# Patient Record
Sex: MALE | Race: WHITE | HISPANIC OR LATINO | URBAN - METROPOLITAN AREA
[De-identification: names, ages, dates, MRNs, and addresses within clinical notes are randomized per-mention and may not be internally consistent; named-entity substitution may affect disease eponyms.]

---

## 2018-09-16 ENCOUNTER — EMERGENCY (EMERGENCY)
Facility: HOSPITAL | Age: 78
LOS: 1 days | Discharge: HOME | End: 2018-09-16
Attending: EMERGENCY MEDICINE | Admitting: EMERGENCY MEDICINE
Payer: MEDICARE

## 2018-09-16 VITALS
SYSTOLIC BLOOD PRESSURE: 161 MMHG | RESPIRATION RATE: 18 BRPM | DIASTOLIC BLOOD PRESSURE: 75 MMHG | TEMPERATURE: 98 F | HEART RATE: 53 BPM | OXYGEN SATURATION: 97 %

## 2018-09-16 VITALS
HEART RATE: 74 BPM | RESPIRATION RATE: 18 BRPM | SYSTOLIC BLOOD PRESSURE: 170 MMHG | TEMPERATURE: 99 F | OXYGEN SATURATION: 96 % | DIASTOLIC BLOOD PRESSURE: 81 MMHG

## 2018-09-16 DIAGNOSIS — Z88.8 ALLERGY STATUS TO OTHER DRUGS, MEDICAMENTS AND BIOLOGICAL SUBSTANCES: ICD-10-CM

## 2018-09-16 DIAGNOSIS — Z79.899 OTHER LONG TERM (CURRENT) DRUG THERAPY: ICD-10-CM

## 2018-09-16 DIAGNOSIS — K64.8 OTHER HEMORRHOIDS: ICD-10-CM

## 2018-09-16 DIAGNOSIS — I10 ESSENTIAL (PRIMARY) HYPERTENSION: ICD-10-CM

## 2018-09-16 DIAGNOSIS — K62.5 HEMORRHAGE OF ANUS AND RECTUM: ICD-10-CM

## 2018-09-16 LAB
ALBUMIN SERPL ELPH-MCNC: 4.6 G/DL — SIGNIFICANT CHANGE UP (ref 3.3–5)
ALP SERPL-CCNC: 75 U/L — SIGNIFICANT CHANGE UP (ref 40–120)
ALT FLD-CCNC: 29 U/L — SIGNIFICANT CHANGE UP (ref 10–45)
ANION GAP SERPL CALC-SCNC: 17 MMOL/L — SIGNIFICANT CHANGE UP (ref 5–17)
AST SERPL-CCNC: 20 U/L — SIGNIFICANT CHANGE UP (ref 10–40)
BASOPHILS NFR BLD AUTO: 0.5 % — SIGNIFICANT CHANGE UP (ref 0–2)
BILIRUB SERPL-MCNC: 0.5 MG/DL — SIGNIFICANT CHANGE UP (ref 0.2–1.2)
BUN SERPL-MCNC: 23 MG/DL — SIGNIFICANT CHANGE UP (ref 7–23)
CALCIUM SERPL-MCNC: 9.4 MG/DL — SIGNIFICANT CHANGE UP (ref 8.4–10.5)
CHLORIDE SERPL-SCNC: 98 MMOL/L — SIGNIFICANT CHANGE UP (ref 96–108)
CO2 SERPL-SCNC: 23 MMOL/L — SIGNIFICANT CHANGE UP (ref 22–31)
CREAT SERPL-MCNC: 0.94 MG/DL — SIGNIFICANT CHANGE UP (ref 0.5–1.3)
EOSINOPHIL NFR BLD AUTO: 2.4 % — SIGNIFICANT CHANGE UP (ref 0–6)
GLUCOSE SERPL-MCNC: 113 MG/DL — HIGH (ref 70–99)
HCT VFR BLD CALC: 42.4 % — SIGNIFICANT CHANGE UP (ref 39–50)
HGB BLD-MCNC: 14.9 G/DL — SIGNIFICANT CHANGE UP (ref 13–17)
LYMPHOCYTES # BLD AUTO: 25 % — SIGNIFICANT CHANGE UP (ref 13–44)
MCHC RBC-ENTMCNC: 30.6 PG — SIGNIFICANT CHANGE UP (ref 27–34)
MCHC RBC-ENTMCNC: 35.1 G/DL — SIGNIFICANT CHANGE UP (ref 32–36)
MCV RBC AUTO: 87.1 FL — SIGNIFICANT CHANGE UP (ref 80–100)
MONOCYTES NFR BLD AUTO: 8.6 % — SIGNIFICANT CHANGE UP (ref 2–14)
NEUTROPHILS NFR BLD AUTO: 63.5 % — SIGNIFICANT CHANGE UP (ref 43–77)
PLATELET # BLD AUTO: 135 K/UL — LOW (ref 150–400)
POTASSIUM SERPL-MCNC: 3.6 MMOL/L — SIGNIFICANT CHANGE UP (ref 3.5–5.3)
POTASSIUM SERPL-SCNC: 3.6 MMOL/L — SIGNIFICANT CHANGE UP (ref 3.5–5.3)
PROT SERPL-MCNC: 8 G/DL — SIGNIFICANT CHANGE UP (ref 6–8.3)
RBC # BLD: 4.87 M/UL — SIGNIFICANT CHANGE UP (ref 4.2–5.8)
RBC # FLD: 13.1 % — SIGNIFICANT CHANGE UP (ref 10.3–16.9)
SODIUM SERPL-SCNC: 138 MMOL/L — SIGNIFICANT CHANGE UP (ref 135–145)
WBC # BLD: 8.8 K/UL — SIGNIFICANT CHANGE UP (ref 3.8–10.5)
WBC # FLD AUTO: 8.8 K/UL — SIGNIFICANT CHANGE UP (ref 3.8–10.5)

## 2018-09-16 PROCEDURE — 99284 EMERGENCY DEPT VISIT MOD MDM: CPT | Mod: 25

## 2018-09-16 PROCEDURE — 80053 COMPREHEN METABOLIC PANEL: CPT

## 2018-09-16 PROCEDURE — 99283 EMERGENCY DEPT VISIT LOW MDM: CPT

## 2018-09-16 PROCEDURE — 85025 COMPLETE CBC W/AUTO DIFF WBC: CPT

## 2018-09-16 PROCEDURE — 36415 COLL VENOUS BLD VENIPUNCTURE: CPT

## 2018-09-16 RX ORDER — DOCUSATE SODIUM 100 MG
1 CAPSULE ORAL
Qty: 20 | Refills: 0
Start: 2018-09-16 | End: 2018-09-25

## 2018-09-16 RX ORDER — HYDROCORTISONE 1 %
1 OINTMENT (GRAM) TOPICAL
Qty: 14 | Refills: 0
Start: 2018-09-16 | End: 2018-09-22

## 2018-09-16 NOTE — ED PROVIDER NOTE - OBJECTIVE STATEMENT
78 y/o m hx HTN presents c/o one week of hard stools, straining for bowel movement.  Pt stating yesterday, noticed small amount of blood when he wiped, today there was blood in toilet bowl.  Pt denies fever, chills, abd pain, black stools, all other ROS negative.

## 2018-09-16 NOTE — ED ADULT NURSE NOTE - NSIMPLEMENTINTERV_GEN_ALL_ED
Implemented All Universal Safety Interventions:  [place] to call system. Call bell, personal items and telephone within reach. Instruct patient to call for assistance. Room bathroom lighting operational. Non-slip footwear when patient is off stretcher. Physically safe environment: no spills, clutter or unnecessary equipment. Stretcher in lowest position, wheels locked, appropriate side rails in place.

## 2018-09-16 NOTE — ED PROVIDER NOTE - ATTENDING CONTRIBUTION TO CARE
Attending Statement: I have personally performed a face to face diagnostic evaluation on this patient. I have reviewed the ACP note and agree with the history, exam and plan of care, except as noted.     Attending Contribution to Care:  78 y/o m hx HTN presents with blood in toilet bowel after bowel movement; pt with straining with bowel movement this week.  Internal hemorrhoid on exam, labs wnl.  Will plan for anusol suppositories, stool softener, The patient is stable for DC. They were advised to call their PMD for prompt outpatient follow up. Return precautions were discussed. The patient was advised to return to the ER for any concerning or worsening symptoms.

## 2018-09-16 NOTE — ED PROVIDER NOTE - MEDICAL DECISION MAKING DETAILS
78 y/o m hx HTN presents with blood in toilet bowel after bowel movement; pt with straining with bowel movement this week.  Internal hemorrhoid on exam, labs wnl.  Will plan for anusol suppositories, stool softener, f/u pmd.

## 2019-09-16 ENCOUNTER — OUTPATIENT (OUTPATIENT)
Dept: OUTPATIENT SERVICES | Facility: HOSPITAL | Age: 79
LOS: 1 days | End: 2019-09-16
Payer: MEDICARE

## 2019-09-16 DIAGNOSIS — R07.9 CHEST PAIN, UNSPECIFIED: ICD-10-CM

## 2019-09-16 PROCEDURE — 93017 CV STRESS TEST TRACING ONLY: CPT

## 2019-09-17 ENCOUNTER — APPOINTMENT (OUTPATIENT)
Dept: MRI IMAGING | Facility: HOSPITAL | Age: 79
End: 2019-09-17

## 2019-12-27 ENCOUNTER — EMERGENCY (EMERGENCY)
Facility: HOSPITAL | Age: 79
LOS: 1 days | Discharge: HOME | End: 2019-12-27
Attending: EMERGENCY MEDICINE | Admitting: EMERGENCY MEDICINE
Payer: MEDICARE

## 2019-12-27 VITALS
HEIGHT: 70 IN | RESPIRATION RATE: 17 BRPM | HEART RATE: 67 BPM | DIASTOLIC BLOOD PRESSURE: 68 MMHG | TEMPERATURE: 100 F | WEIGHT: 164.91 LBS | SYSTOLIC BLOOD PRESSURE: 148 MMHG | OXYGEN SATURATION: 96 %

## 2019-12-27 VITALS
RESPIRATION RATE: 16 BRPM | SYSTOLIC BLOOD PRESSURE: 116 MMHG | HEART RATE: 61 BPM | OXYGEN SATURATION: 100 % | TEMPERATURE: 98 F | DIASTOLIC BLOOD PRESSURE: 78 MMHG

## 2019-12-27 DIAGNOSIS — J10.1 INFLUENZA DUE TO OTHER IDENTIFIED INFLUENZA VIRUS WITH OTHER RESPIRATORY MANIFESTATIONS: ICD-10-CM

## 2019-12-27 DIAGNOSIS — R50.9 FEVER, UNSPECIFIED: ICD-10-CM

## 2019-12-27 LAB
ALBUMIN SERPL ELPH-MCNC: 4.6 G/DL — SIGNIFICANT CHANGE UP (ref 3.3–5)
ALP SERPL-CCNC: 73 U/L — SIGNIFICANT CHANGE UP (ref 40–120)
ALT FLD-CCNC: 15 U/L — SIGNIFICANT CHANGE UP (ref 10–45)
ANION GAP SERPL CALC-SCNC: 13 MMOL/L — SIGNIFICANT CHANGE UP (ref 5–17)
AST SERPL-CCNC: 18 U/L — SIGNIFICANT CHANGE UP (ref 10–40)
BASOPHILS # BLD AUTO: 0.05 K/UL — SIGNIFICANT CHANGE UP (ref 0–0.2)
BASOPHILS NFR BLD AUTO: 0.7 % — SIGNIFICANT CHANGE UP (ref 0–2)
BILIRUB SERPL-MCNC: 0.5 MG/DL — SIGNIFICANT CHANGE UP (ref 0.2–1.2)
BUN SERPL-MCNC: 16 MG/DL — SIGNIFICANT CHANGE UP (ref 7–23)
CALCIUM SERPL-MCNC: 8.9 MG/DL — SIGNIFICANT CHANGE UP (ref 8.4–10.5)
CHLORIDE SERPL-SCNC: 98 MMOL/L — SIGNIFICANT CHANGE UP (ref 96–108)
CO2 SERPL-SCNC: 23 MMOL/L — SIGNIFICANT CHANGE UP (ref 22–31)
CREAT SERPL-MCNC: 0.94 MG/DL — SIGNIFICANT CHANGE UP (ref 0.5–1.3)
EOSINOPHIL # BLD AUTO: 0.02 K/UL — SIGNIFICANT CHANGE UP (ref 0–0.5)
EOSINOPHIL NFR BLD AUTO: 0.3 % — SIGNIFICANT CHANGE UP (ref 0–6)
FLU A RESULT: DETECTED
FLU A RESULT: DETECTED
FLUAV AG NPH QL: DETECTED
FLUBV AG NPH QL: SIGNIFICANT CHANGE UP
GLUCOSE SERPL-MCNC: 101 MG/DL — HIGH (ref 70–99)
HCT VFR BLD CALC: 42.3 % — SIGNIFICANT CHANGE UP (ref 39–50)
HGB BLD-MCNC: 14.8 G/DL — SIGNIFICANT CHANGE UP (ref 13–17)
IMM GRANULOCYTES NFR BLD AUTO: 0.3 % — SIGNIFICANT CHANGE UP (ref 0–1.5)
LACTATE SERPL-SCNC: 1.3 MMOL/L — SIGNIFICANT CHANGE UP (ref 0.5–2)
LYMPHOCYTES # BLD AUTO: 1.17 K/UL — SIGNIFICANT CHANGE UP (ref 1–3.3)
LYMPHOCYTES # BLD AUTO: 17.2 % — SIGNIFICANT CHANGE UP (ref 13–44)
MCHC RBC-ENTMCNC: 31.6 PG — SIGNIFICANT CHANGE UP (ref 27–34)
MCHC RBC-ENTMCNC: 35 GM/DL — SIGNIFICANT CHANGE UP (ref 32–36)
MCV RBC AUTO: 90.4 FL — SIGNIFICANT CHANGE UP (ref 80–100)
MONOCYTES # BLD AUTO: 0.8 K/UL — SIGNIFICANT CHANGE UP (ref 0–0.9)
MONOCYTES NFR BLD AUTO: 11.7 % — SIGNIFICANT CHANGE UP (ref 2–14)
NEUTROPHILS # BLD AUTO: 4.75 K/UL — SIGNIFICANT CHANGE UP (ref 1.8–7.4)
NEUTROPHILS NFR BLD AUTO: 69.8 % — SIGNIFICANT CHANGE UP (ref 43–77)
NRBC # BLD: 0 /100 WBCS — SIGNIFICANT CHANGE UP (ref 0–0)
PLATELET # BLD AUTO: 112 K/UL — LOW (ref 150–400)
POTASSIUM SERPL-MCNC: 3.5 MMOL/L — SIGNIFICANT CHANGE UP (ref 3.5–5.3)
POTASSIUM SERPL-SCNC: 3.5 MMOL/L — SIGNIFICANT CHANGE UP (ref 3.5–5.3)
PROT SERPL-MCNC: 8 G/DL — SIGNIFICANT CHANGE UP (ref 6–8.3)
RBC # BLD: 4.68 M/UL — SIGNIFICANT CHANGE UP (ref 4.2–5.8)
RBC # FLD: 13.1 % — SIGNIFICANT CHANGE UP (ref 10.3–14.5)
RSV RESULT: SIGNIFICANT CHANGE UP
RSV RNA RESP QL NAA+PROBE: SIGNIFICANT CHANGE UP
SODIUM SERPL-SCNC: 134 MMOL/L — LOW (ref 135–145)
WBC # BLD: 6.81 K/UL — SIGNIFICANT CHANGE UP (ref 3.8–10.5)
WBC # FLD AUTO: 6.81 K/UL — SIGNIFICANT CHANGE UP (ref 3.8–10.5)

## 2019-12-27 PROCEDURE — 71046 X-RAY EXAM CHEST 2 VIEWS: CPT | Mod: 26,59

## 2019-12-27 PROCEDURE — 83605 ASSAY OF LACTIC ACID: CPT

## 2019-12-27 PROCEDURE — 85025 COMPLETE CBC W/AUTO DIFF WBC: CPT

## 2019-12-27 PROCEDURE — 99284 EMERGENCY DEPT VISIT MOD MDM: CPT | Mod: 25

## 2019-12-27 PROCEDURE — 80053 COMPREHEN METABOLIC PANEL: CPT

## 2019-12-27 PROCEDURE — 87631 RESP VIRUS 3-5 TARGETS: CPT

## 2019-12-27 PROCEDURE — 99283 EMERGENCY DEPT VISIT LOW MDM: CPT | Mod: 25

## 2019-12-27 PROCEDURE — 87040 BLOOD CULTURE FOR BACTERIA: CPT

## 2019-12-27 PROCEDURE — 71046 X-RAY EXAM CHEST 2 VIEWS: CPT | Mod: 26

## 2019-12-27 PROCEDURE — 71046 X-RAY EXAM CHEST 2 VIEWS: CPT

## 2019-12-27 PROCEDURE — 36415 COLL VENOUS BLD VENIPUNCTURE: CPT

## 2019-12-27 RX ORDER — SODIUM CHLORIDE 9 MG/ML
1000 INJECTION INTRAMUSCULAR; INTRAVENOUS; SUBCUTANEOUS ONCE
Refills: 0 | Status: COMPLETED | OUTPATIENT
Start: 2019-12-27 | End: 2019-12-27

## 2019-12-27 RX ORDER — ACETAMINOPHEN 500 MG
650 TABLET ORAL ONCE
Refills: 0 | Status: COMPLETED | OUTPATIENT
Start: 2019-12-27 | End: 2019-12-27

## 2019-12-27 RX ADMIN — SODIUM CHLORIDE 1000 MILLILITER(S): 9 INJECTION INTRAMUSCULAR; INTRAVENOUS; SUBCUTANEOUS at 08:46

## 2019-12-27 RX ADMIN — Medication 650 MILLIGRAM(S): at 08:45

## 2019-12-27 RX ADMIN — Medication 75 MILLIGRAM(S): at 10:04

## 2019-12-27 NOTE — ED PROVIDER NOTE - CLINICAL SUMMARY MEDICAL DECISION MAKING FREE TEXT BOX
Flu like symptoms, + crackles L base.  CXR no consolidation.  Labs stable.  Unlikely superimposed pna at this time.  Plan dc with tamiflu and close outpt pcp fu.

## 2019-12-27 NOTE — ED PROVIDER NOTE - OBJECTIVE STATEMENT
77 yo M with fever, cough, congestion, body aches for several days.  Denies chest pain of sob.  No sick contacts or recent travel.

## 2019-12-27 NOTE — ED ADULT NURSE NOTE - NSIMPLEMENTINTERV_GEN_ALL_ED
Implemented All Universal Safety Interventions:  [place] to call system. Call bell, personal items and telephone within reach. Instruct patient to call for assistance. Room bathroom lighting operational. Non-slip footwear when patient is off stretcher. Physically safe environment: no spills, clutter or unnecessary equipment. Stretcher in lowest position, wheels locked, appropriate side rails in place.

## 2019-12-27 NOTE — ED ADULT NURSE NOTE - OBJECTIVE STATEMENT
Patient presents to the ED c/o fever, cough x Wednesday.    Patient also reports generalized weakness.  Has been taking Tylenol at home for fever.  On arrival AA&OX3, respirations unlabored, non-diaphoretic, no pallor.  Crackles to left lung base.

## 2019-12-27 NOTE — ED PROVIDER NOTE - NSFOLLOWUPINSTRUCTIONS_ED_ALL_ED_FT
Influenza    WHAT YOU NEED TO KNOW:    Influenza (the flu) is an infection caused by the influenza virus. The flu is easily spread when an infected person coughs, sneezes, or has close contact with others. You may be able to spread the flu to others for 1 week or longer after signs or symptoms appear.     DISCHARGE INSTRUCTIONS:    Call your local emergency number (911 in the ) if:     You have trouble breathing, and your lips look purple or blue.      You have a seizure.    Call your doctor if:     You are dizzy, or you are urinating less or not at all.       You have a headache with a stiff neck, and you feel tired or confused.      You have new pain or pressure in your chest.      Your symptoms, such as shortness of breath, vomiting, or diarrhea, get worse.       Your symptoms, such as fever and coughing, seem to get better, but then get worse.       You have new muscle pain or weakness.      You have questions or concerns about your condition or care.    Medicines: You may need any of the following:     Acetaminophen decreases pain and fever. It is available without a doctor's order. Ask how much to take and how often to take it. Follow directions. Read the labels of all other medicines you are using to see if they also contain acetaminophen, or ask your doctor or pharmacist. Acetaminophen can cause liver damage if not taken correctly. Do not use more than 4 grams (4,000 milligrams) total of acetaminophen in one day.       NSAIDs, such as ibuprofen, help decrease swelling, pain, and fever. This medicine is available with or without a doctor's order. NSAIDs can cause stomach bleeding or kidney problems in certain people. If you take blood thinner medicine, always ask your healthcare provider if NSAIDs are safe for you. Always read the medicine label and follow directions.      Antivirals help fight a viral infection.      Take your medicine as directed. Contact your healthcare provider if you think your medicine is not helping or if you have side effects. Tell him or her if you are allergic to any medicine. Keep a list of the medicines, vitamins, and herbs you take. Include the amounts, and when and why you take them. Bring the list or the pill bottles to follow-up visits. Carry your medicine list with you in case of an emergency.    Rest as much as you can to help you recover.    Drink liquids as directed to help prevent dehydration. Ask how much liquid to drink each day and which liquids are best for you.    Prevent the spread of influenza:     Wash your hands often. Use soap and water. Wash your hands after you use the bathroom, change a child's diapers, or sneeze. Wash your hands before you prepare or eat food. Use gel hand cleanser that has 60% alcohol, when soap and water are not available. Do not touch your eyes, nose, or mouth unless you have washed your hands first.Handwashing           Cover your mouth when you sneeze or cough. Cough into a tissue or the bend of your arm. If you use a tissue, throw it away immediately and wash your hands.       Clean shared items with a germ-killing . Clean table surfaces, doorknobs, and light switches. Do not share towels, silverware, and dishes with people who are sick. Wash bed sheets, towels, silverware, and dishes with soap and water.       Wear a mask over your mouth and nose if you are sick. The face mask may help protect others from becoming infected with the flu. Wear the mask when in common areas of your home or if you seek care with a healthcare provider.       Stay away from others if you are sick. Stay at home until 24 hours after your fever and symptoms are gone.      Influenza vaccine helps prevent influenza (flu). Everyone older than 6 months should get a yearly influenza vaccine. Get the vaccine as soon as it is available, usually in September or October each year.    Follow up with your healthcare provider as directed: Write down your questions so you remember to ask them during your visits.        © Copyright [employer] 2018 All illustrations and images included in CareNotes are the copyrighted property of [employer] or [employer]       Follow up with your PCP in 3-4 days.

## 2019-12-27 NOTE — ED PROVIDER NOTE - PATIENT PORTAL LINK FT
You can access the FollowMyHealth Patient Portal offered by [facility] by registering at the following website: [email or web address]. By joining [employer]’s FollowMyHealth portal, you will also be able to view your health information using other applications (apps) compatible with our system.

## 2020-01-01 LAB
CULTURE RESULTS: SIGNIFICANT CHANGE UP
CULTURE RESULTS: SIGNIFICANT CHANGE UP
SPECIMEN SOURCE: SIGNIFICANT CHANGE UP
SPECIMEN SOURCE: SIGNIFICANT CHANGE UP

## 2020-01-28 ENCOUNTER — EMERGENCY (EMERGENCY)
Facility: HOSPITAL | Age: 80
LOS: 1 days | Discharge: HOME | End: 2020-01-28
Attending: EMERGENCY MEDICINE | Admitting: EMERGENCY MEDICINE
Payer: COMMERCIAL

## 2020-01-28 VITALS
OXYGEN SATURATION: 97 % | HEIGHT: 66 IN | WEIGHT: 184.97 LBS | HEART RATE: 101 BPM | DIASTOLIC BLOOD PRESSURE: 84 MMHG | RESPIRATION RATE: 16 BRPM | SYSTOLIC BLOOD PRESSURE: 145 MMHG | TEMPERATURE: 98 F

## 2020-01-28 PROCEDURE — 85025 COMPLETE CBC W/AUTO DIFF WBC: CPT

## 2020-01-28 PROCEDURE — 99284 EMERGENCY DEPT VISIT MOD MDM: CPT

## 2020-01-28 PROCEDURE — 96374 THER/PROPH/DIAG INJ IV PUSH: CPT

## 2020-01-28 PROCEDURE — 99284 EMERGENCY DEPT VISIT MOD MDM: CPT | Mod: 25

## 2020-01-28 PROCEDURE — 83690 ASSAY OF LIPASE: CPT

## 2020-01-28 PROCEDURE — 36415 COLL VENOUS BLD VENIPUNCTURE: CPT

## 2020-01-28 PROCEDURE — 96375 TX/PRO/DX INJ NEW DRUG ADDON: CPT

## 2020-01-28 PROCEDURE — 80053 COMPREHEN METABOLIC PANEL: CPT

## 2020-01-28 RX ORDER — ONDANSETRON 8 MG/1
1 TABLET, FILM COATED ORAL
Qty: 9 | Refills: 0
Start: 2020-01-28 | End: 2020-01-30

## 2020-01-28 RX ORDER — ONDANSETRON 8 MG/1
4 TABLET, FILM COATED ORAL ONCE
Refills: 0 | Status: COMPLETED | OUTPATIENT
Start: 2020-01-28 | End: 2020-01-28

## 2020-01-28 RX ORDER — FAMOTIDINE 10 MG/ML
20 INJECTION INTRAVENOUS ONCE
Refills: 0 | Status: COMPLETED | OUTPATIENT
Start: 2020-01-28 | End: 2020-01-28

## 2020-01-28 RX ORDER — FAMOTIDINE 10 MG/ML
1 INJECTION INTRAVENOUS
Qty: 14 | Refills: 0
Start: 2020-01-28 | End: 2020-02-03

## 2020-01-28 RX ORDER — SODIUM CHLORIDE 9 MG/ML
1000 INJECTION INTRAMUSCULAR; INTRAVENOUS; SUBCUTANEOUS ONCE
Refills: 0 | Status: COMPLETED | OUTPATIENT
Start: 2020-01-28 | End: 2020-01-28

## 2020-01-28 RX ADMIN — FAMOTIDINE 20 MILLIGRAM(S): 10 INJECTION INTRAVENOUS at 06:05

## 2020-01-28 RX ADMIN — SODIUM CHLORIDE 1000 MILLILITER(S): 9 INJECTION INTRAMUSCULAR; INTRAVENOUS; SUBCUTANEOUS at 06:05

## 2020-01-28 RX ADMIN — ONDANSETRON 4 MILLIGRAM(S): 8 TABLET, FILM COATED ORAL at 06:05

## 2020-01-28 NOTE — ED PROVIDER NOTE - ATTENDING CONTRIBUTION TO CARE
78 y/o M, BIBA, c/o n/v/d s/p eating pork sausages and cake. Pt states that felt "uneasy" in stomach after eating, then got nauseous and vomited x4, non bloody, non bilious. Denies fevers, chills, cp, sob, abd pain, dizziness, syncope. Pt with PSH of appendectomy. Pt AAO, NAD, RRR, Abd: soft with mild epigastric abd TTP. Pt is well appearing non toxic, labs wnl, improved w/ivf and meds, tolerated po challenge, suspect viral gastroenteritis vs food bourne illness, supportive care - hydration , brat diet discussed at length, prn zofran and to take pepcid, f/u w/pmd pt understands and agrees w/plan, strict return precautions given.

## 2020-01-28 NOTE — ED PROVIDER NOTE - PATIENT PORTAL LINK FT
You can access the FollowMyHealth Patient Portal offered by [facility] by registering at the following website: [email or web address]. By joining [employer]’s FollowMyHealth portal, you will also be able to view your health information using other applications (apps) compatible with our system.

## 2020-01-28 NOTE — ED ADULT NURSE NOTE - OBJECTIVE STATEMENT
80 y/o male c/o nausea and vomiting. Pt reports vomiting multiple times this morning. Pt reports feeling a little better afterwards. pt denies urinary complaints. pt speaks clear, MAEx4, ambulates steady, unlabored breathing. Abd soft ntnd. Skin dry warm.

## 2020-01-28 NOTE — ED PROVIDER NOTE - NSFOLLOWUPINSTRUCTIONS_ED_ALL_ED_FT
[place] Diet  A bland diet consists of foods that are often soft and do not have a lot of fat, fiber, or extra seasonings. Foods without fat, fiber, or seasoning are easier for the body to digest. They are also less likely to irritate your mouth, throat, stomach, and other parts of your digestive system. A bland diet is sometimes called a BRAT diet.  What is my plan?  Your health care provider or food and nutrition specialist (dietitian) may recommend specific changes to your diet to prevent symptoms or to treat your symptoms. These changes may include:  Eating small meals often.Cooking food until it is soft enough to chew easily.Chewing your food well.Drinking fluids slowly.Not eating foods that are very spicy, sour, or fatty.Not eating citrus fruits, such as oranges and grapefruit.What do I need to know about this diet?  Eat a variety of foods from the bland diet food list.Do not follow a bland diet longer than needed.Ask your health care provider whether you should take vitamins or supplements.What foods can I eat?  Grains   Hot cereals, such as cream of wheat. Rice. Bread, crackers, or tortillas made from refined white flour.  Vegetables   Canned or cooked vegetables. Mashed or boiled potatoes.  Fruits   Bananas. Applesauce. Other types of cooked or canned fruit with the skin and seeds removed, such as canned peaches or pears.  Meats and other proteins   Scrambled eggs. Creamy peanut butter or other nut butters. Lean, well-cooked meats, such as chicken or fish. Tofu. Soups or broths.  Dairy  Low-fat dairy products, such as milk, cottage cheese, or yogurt.  Beverages   Water. Herbal tea. Apple juice.  Fats and oils   Mild salad dressings. Canola or olive oil.  Sweets and desserts   Pudding. Custard. Fruit gelatin. Ice cream.  The items listed above may not be a complete list of recommended foods and beverages. Contact a dietitian for more options.   What foods are not recommended?  Grains   Whole grain breads and cereals.  Vegetables   Raw vegetables.  Fruits   Raw fruits, especially citrus, berries, or dried fruits.  Dairy   Whole fat dairy foods.  Beverages   Caffeinated drinks. Alcohol.  Seasonings and condiments   Strongly flavored seasonings or condiments. Hot sauce. Salsa.  Other foods   Spicy foods. Fried foods. Sour foods, such as pickled or fermented foods. Foods with high sugar content. Foods high in fiber.  The items listed above may not be a complete list of foods and beverages to avoid. Contact a dietitian for more information.   Summary  A bland diet consists of foods that are often soft and do not have a lot of fat, fiber, or extra seasonings.Foods without fat, fiber, or seasoning are easier for the body to digest.Check with your health care provider to see how long you should follow this diet plan. It is not meant to be followed for long periods.

## 2020-01-28 NOTE — ED PROVIDER NOTE - CLINICAL SUMMARY MEDICAL DECISION MAKING FREE TEXT BOX
pt c/o n/v/d after eating dinner, non bloody non bilious, no fevers or abd pain, benign abd on exam, hx of appendectomy yrs ago, well appearing non toxic, labs wnl, improved w/ivf and meds, tolerated po challenge, suspect viral gastroenteritis vs food bourne illness, supportive care - hydration , brat diet discussed at length, prn zofran and to take pepcid, f/u w/pmd pt understands and agrees w/plan, strict return precautions given

## 2020-01-28 NOTE — ED PROVIDER NOTE - OBJECTIVE STATEMENT
The pt is a 80 y/o M, BIBA, c/o n/v/d s/p eating pork sausages and cake. Pt states that felt "uneasy" in stomach after eating, then got nauseous and vomited x4, non bloody, non bilious. Denies fevers, chills, cp, sob, abd pain, dizziness, syncope

## 2020-02-02 DIAGNOSIS — Z91.018 ALLERGY TO OTHER FOODS: ICD-10-CM

## 2020-02-02 DIAGNOSIS — Z88.0 ALLERGY STATUS TO PENICILLIN: ICD-10-CM

## 2020-02-02 DIAGNOSIS — K52.9 NONINFECTIVE GASTROENTERITIS AND COLITIS, UNSPECIFIED: ICD-10-CM

## 2020-02-02 DIAGNOSIS — I10 ESSENTIAL (PRIMARY) HYPERTENSION: ICD-10-CM

## 2020-02-02 DIAGNOSIS — Z79.899 OTHER LONG TERM (CURRENT) DRUG THERAPY: ICD-10-CM

## 2020-02-02 DIAGNOSIS — R11.2 NAUSEA WITH VOMITING, UNSPECIFIED: ICD-10-CM

## 2020-12-28 ENCOUNTER — INPATIENT (INPATIENT)
Facility: HOSPITAL | Age: 80
LOS: 2 days | Discharge: HOME | DRG: 63 | End: 2020-12-31
Attending: PSYCHIATRY & NEUROLOGY | Admitting: PSYCHIATRY & NEUROLOGY
Payer: MEDICARE

## 2020-12-28 VITALS
HEART RATE: 90 BPM | HEIGHT: 66 IN | OXYGEN SATURATION: 97 % | SYSTOLIC BLOOD PRESSURE: 158 MMHG | WEIGHT: 165.79 LBS | TEMPERATURE: 98 F | DIASTOLIC BLOOD PRESSURE: 78 MMHG | RESPIRATION RATE: 18 BRPM

## 2020-12-28 LAB
ALBUMIN SERPL ELPH-MCNC: 4.5 G/DL — SIGNIFICANT CHANGE UP (ref 3.3–5)
ALP SERPL-CCNC: 73 U/L — SIGNIFICANT CHANGE UP (ref 40–120)
ALT FLD-CCNC: 17 U/L — SIGNIFICANT CHANGE UP (ref 10–45)
ANION GAP SERPL CALC-SCNC: 12 MMOL/L — SIGNIFICANT CHANGE UP (ref 5–17)
APTT BLD: 29.6 SEC — SIGNIFICANT CHANGE UP (ref 27.5–35.5)
AST SERPL-CCNC: 19 U/L — SIGNIFICANT CHANGE UP (ref 10–40)
BASOPHILS # BLD AUTO: 0.05 K/UL — SIGNIFICANT CHANGE UP (ref 0–0.2)
BASOPHILS NFR BLD AUTO: 0.7 % — SIGNIFICANT CHANGE UP (ref 0–2)
BILIRUB SERPL-MCNC: 0.6 MG/DL — SIGNIFICANT CHANGE UP (ref 0.2–1.2)
BUN SERPL-MCNC: 25 MG/DL — HIGH (ref 7–23)
CALCIUM SERPL-MCNC: 9.3 MG/DL — SIGNIFICANT CHANGE UP (ref 8.4–10.5)
CHLORIDE SERPL-SCNC: 104 MMOL/L — SIGNIFICANT CHANGE UP (ref 96–108)
CO2 SERPL-SCNC: 23 MMOL/L — SIGNIFICANT CHANGE UP (ref 22–31)
CREAT SERPL-MCNC: 1.05 MG/DL — SIGNIFICANT CHANGE UP (ref 0.5–1.3)
EOSINOPHIL # BLD AUTO: 0.05 K/UL — SIGNIFICANT CHANGE UP (ref 0–0.5)
EOSINOPHIL NFR BLD AUTO: 0.7 % — SIGNIFICANT CHANGE UP (ref 0–6)
GLUCOSE SERPL-MCNC: 101 MG/DL — HIGH (ref 70–99)
HCT VFR BLD CALC: 41.1 % — SIGNIFICANT CHANGE UP (ref 39–50)
HGB BLD-MCNC: 14.3 G/DL — SIGNIFICANT CHANGE UP (ref 13–17)
IMM GRANULOCYTES NFR BLD AUTO: 0.3 % — SIGNIFICANT CHANGE UP (ref 0–1.5)
INR BLD: 1.29 — HIGH (ref 0.88–1.16)
LYMPHOCYTES # BLD AUTO: 1.01 K/UL — SIGNIFICANT CHANGE UP (ref 1–3.3)
LYMPHOCYTES # BLD AUTO: 14.1 % — SIGNIFICANT CHANGE UP (ref 13–44)
MCHC RBC-ENTMCNC: 30.1 PG — SIGNIFICANT CHANGE UP (ref 27–34)
MCHC RBC-ENTMCNC: 34.8 GM/DL — SIGNIFICANT CHANGE UP (ref 32–36)
MCV RBC AUTO: 86.5 FL — SIGNIFICANT CHANGE UP (ref 80–100)
MONOCYTES # BLD AUTO: 0.56 K/UL — SIGNIFICANT CHANGE UP (ref 0–0.9)
MONOCYTES NFR BLD AUTO: 7.8 % — SIGNIFICANT CHANGE UP (ref 2–14)
NEUTROPHILS # BLD AUTO: 5.45 K/UL — SIGNIFICANT CHANGE UP (ref 1.8–7.4)
NEUTROPHILS NFR BLD AUTO: 76.4 % — SIGNIFICANT CHANGE UP (ref 43–77)
NRBC # BLD: 0 /100 WBCS — SIGNIFICANT CHANGE UP (ref 0–0)
PLATELET # BLD AUTO: 158 K/UL — SIGNIFICANT CHANGE UP (ref 150–400)
POTASSIUM SERPL-MCNC: 3.8 MMOL/L — SIGNIFICANT CHANGE UP (ref 3.5–5.3)
POTASSIUM SERPL-SCNC: 3.8 MMOL/L — SIGNIFICANT CHANGE UP (ref 3.5–5.3)
PROT SERPL-MCNC: 8 G/DL — SIGNIFICANT CHANGE UP (ref 6–8.3)
PROTHROM AB SERPL-ACNC: 15.3 SEC — HIGH (ref 10.6–13.6)
RBC # BLD: 4.75 M/UL — SIGNIFICANT CHANGE UP (ref 4.2–5.8)
RBC # FLD: 12.6 % — SIGNIFICANT CHANGE UP (ref 10.3–14.5)
SARS-COV-2 RNA SPEC QL NAA+PROBE: SIGNIFICANT CHANGE UP
SODIUM SERPL-SCNC: 139 MMOL/L — SIGNIFICANT CHANGE UP (ref 135–145)
TROPONIN T SERPL-MCNC: <0.01 NG/ML — SIGNIFICANT CHANGE UP (ref 0–0.01)
WBC # BLD: 7.14 K/UL — SIGNIFICANT CHANGE UP (ref 3.8–10.5)
WBC # FLD AUTO: 7.14 K/UL — SIGNIFICANT CHANGE UP (ref 3.8–10.5)

## 2020-12-28 PROCEDURE — 99291 CRITICAL CARE FIRST HOUR: CPT

## 2020-12-28 PROCEDURE — 99223 1ST HOSP IP/OBS HIGH 75: CPT

## 2020-12-28 PROCEDURE — 0042T: CPT

## 2020-12-28 PROCEDURE — 70498 CT ANGIOGRAPHY NECK: CPT | Mod: 26

## 2020-12-28 PROCEDURE — 70496 CT ANGIOGRAPHY HEAD: CPT | Mod: 26

## 2020-12-28 RX ORDER — ALTEPLASE 100 MG
6.8 KIT INTRAVENOUS ONCE
Refills: 0 | Status: COMPLETED | OUTPATIENT
Start: 2020-12-28 | End: 2020-12-28

## 2020-12-28 RX ORDER — ALTEPLASE 100 MG
60.9 KIT INTRAVENOUS ONCE
Refills: 0 | Status: COMPLETED | OUTPATIENT
Start: 2020-12-28 | End: 2020-12-28

## 2020-12-28 RX ADMIN — ALTEPLASE 408 MILLIGRAM(S): KIT at 14:56

## 2020-12-28 RX ADMIN — ALTEPLASE 60.9 MILLIGRAM(S): KIT at 14:58

## 2020-12-28 NOTE — ED PROVIDER NOTE - OBJECTIVE STATEMENT
78 y/o M with PMHx of HTN HLD, norvasc, lipitor, takes multivitamins. No other rx meds, ? hx of vertigo in past, presents with sudden onset dizziness, described as room spinning that started at 12:30PM, 2H PTA when sitting on couch and watching TV. Patient said that it continued and he attempted to stand up and drink water but it was difficult balancing, felt like he was going to pass out. Able to drink some water but dizziness and imbalance continued so presents to the ED for further evaluation. Had couple episodes of this prior, last episode 3 days ago, with improvement. Went away on its own. 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 No pain, denies focal weakness, numbness, only symptom patient complaints of is dizziness.

## 2020-12-28 NOTE — ED PROVIDER NOTE - CRITICAL CARE PROVIDED
direct patient care (not related to procedure)/interpretation of diagnostic studies/documentation/consultation with other physicians/conducted a detailed discussion of DNR status/consult w/ pt's family directly relating to pts condition

## 2020-12-28 NOTE — ED PROVIDER NOTE - CLINICAL SUMMARY MEDICAL DECISION MAKING FREE TEXT BOX
80 y/o M with PMHx of HTN HLD, ? hx vertigo today with dizziness and lightheadedness when sitting on bed and waking up. Stroke code called and evaluated by stroke team, tPA to be administered to patient. Admit to neurology. 78 y/o M with PMHx of HTN HLD, presents today with sudden onset dizziness and ataxia that started acutely 2 hours PTA. VS noted. Stroke code called and pt evaluated by stroke team in ED. CT Head with no acute hemorrhage. Rest of CTs noted. Per stroke team tPA  administered to patient. Labs/ studies noted. Pt stable in ED post TPA. Admitted to stroke team/ ICU.

## 2020-12-28 NOTE — CONSULT NOTE ADULT - ASSESSMENT
78 y/o male with PMH HTN (on Norvasc 10 mg) and HLD (on Lipitor 10 mg) who self-presented to the ED for acute onset of dizziness since 12:20 PM. NIHSS 4. CTH showing no acute ICH or transcortical infarction. CTP showing no perfusion deficit. CTA showing no LVO or stenosis. Despite negative imaging, tPA administered as 14:56 for focal neurologic deficits of bilateral dysmetria, slurred speech, and left pronator drift. Due to delay in stroke code being called by ED, tPA was outside of door to tpA window of 30 minutes. Pt to be admitted to the MICU for further monitoring and stroke evaluation.     1) Admit to MICU for post tPA monitoring  - NO antiplatelet as post tPA protocol  - Please perform dysphagia screen now   - Once dysphagia screen passed, start Atorvastatin 80 once daily  - Keep BP <180/105  - During infusion, stroke neuro exams every 15 minutes.   - Check stroke neuro checks every 15 minutes for first hour after infusion is stopped; every 30 minutes for the next 6 hours; hourly until 24 hours from end of infusion.   - Repeat CT head with any acute change in mental status.   - Keep temp <100F and maintain glucose 140-180.   - Place order for "Call MD if HR >100 or <55 or SaO2 <95%"  - Minimize arterial and venous punctures  - Maintain strict bed rest for 24 hours with HOB <30 degrees.   - No antiplatelet, anticoagulation, and heparin sq until 24 hour CT head negative for bleed.     2) Labs: Obtain Hemoglobin A1c, Lipid profile set, and TSH    3) Other tests:  - Recommend repeat CT head noncon 24 hours post-TPA to rule out bleed - 12/29/2020 @ 14:56  - Recommend MRI brain without contrast tomorrow  - Recommend TTE w/bubble   - PT/OT order  - Stroke education    4) DVT ppx - SCDs (NO heparin SQ as post tPA protocol)    Raissa Santamaria PA-C

## 2020-12-28 NOTE — ED ADULT NURSE NOTE - OBJECTIVE STATEMENT
presents to ED c/o dizziness since 1220, sudden onset. AAOx3, MAEx4. No other defecits. Hx of HTN and HLD. States he had difficulty ambulating due to being off balance. Also c/o light handedness when lying flat.

## 2020-12-28 NOTE — ED ADULT NURSE NOTE - NSIMPLEMENTINTERV_GEN_ALL_ED
Implemented All Universal Safety Interventions:  [place] to call system. Call bell, personal items and telephone within reach. Instruct patient to call for assistance. Room bathroom lighting operational. Non-slip footwear when patient is off stretcher. Physically safe environment: no spills, clutter or unnecessary equipment. Stretcher in lowest position, wheels locked, appropriate side rails in place.

## 2020-12-29 DIAGNOSIS — I10 ESSENTIAL (PRIMARY) HYPERTENSION: ICD-10-CM

## 2020-12-29 DIAGNOSIS — R63.8 OTHER SYMPTOMS AND SIGNS CONCERNING FOOD AND FLUID INTAKE: ICD-10-CM

## 2020-12-29 DIAGNOSIS — E78.5 HYPERLIPIDEMIA, UNSPECIFIED: ICD-10-CM

## 2020-12-29 LAB
A1C WITH ESTIMATED AVERAGE GLUCOSE RESULT: 5.7 % — HIGH (ref 4–5.6)
ANION GAP SERPL CALC-SCNC: 12 MMOL/L — SIGNIFICANT CHANGE UP (ref 5–17)
BUN SERPL-MCNC: 19 MG/DL — SIGNIFICANT CHANGE UP (ref 7–23)
CALCIUM SERPL-MCNC: 8.9 MG/DL — SIGNIFICANT CHANGE UP (ref 8.4–10.5)
CHLORIDE SERPL-SCNC: 105 MMOL/L — SIGNIFICANT CHANGE UP (ref 96–108)
CHOLEST SERPL-MCNC: 98 MG/DL — SIGNIFICANT CHANGE UP
CO2 SERPL-SCNC: 23 MMOL/L — SIGNIFICANT CHANGE UP (ref 22–31)
CREAT SERPL-MCNC: 0.72 MG/DL — SIGNIFICANT CHANGE UP (ref 0.5–1.3)
ESTIMATED AVERAGE GLUCOSE: 117 MG/DL — HIGH (ref 68–114)
GLUCOSE SERPL-MCNC: 86 MG/DL — SIGNIFICANT CHANGE UP (ref 70–99)
HCT VFR BLD CALC: 40.4 % — SIGNIFICANT CHANGE UP (ref 39–50)
HDLC SERPL-MCNC: 32 MG/DL — LOW
HGB BLD-MCNC: 13.9 G/DL — SIGNIFICANT CHANGE UP (ref 13–17)
LIPID PNL WITH DIRECT LDL SERPL: 53 MG/DL — SIGNIFICANT CHANGE UP
MCHC RBC-ENTMCNC: 30 PG — SIGNIFICANT CHANGE UP (ref 27–34)
MCHC RBC-ENTMCNC: 34.4 GM/DL — SIGNIFICANT CHANGE UP (ref 32–36)
MCV RBC AUTO: 87.1 FL — SIGNIFICANT CHANGE UP (ref 80–100)
NON HDL CHOLESTEROL: 66 MG/DL — SIGNIFICANT CHANGE UP
NRBC # BLD: 0 /100 WBCS — SIGNIFICANT CHANGE UP (ref 0–0)
PLATELET # BLD AUTO: 141 K/UL — LOW (ref 150–400)
POTASSIUM SERPL-MCNC: 3.6 MMOL/L — SIGNIFICANT CHANGE UP (ref 3.5–5.3)
POTASSIUM SERPL-SCNC: 3.6 MMOL/L — SIGNIFICANT CHANGE UP (ref 3.5–5.3)
RBC # BLD: 4.64 M/UL — SIGNIFICANT CHANGE UP (ref 4.2–5.8)
RBC # FLD: 12.7 % — SIGNIFICANT CHANGE UP (ref 10.3–14.5)
SODIUM SERPL-SCNC: 140 MMOL/L — SIGNIFICANT CHANGE UP (ref 135–145)
TRIGL SERPL-MCNC: 66 MG/DL — SIGNIFICANT CHANGE UP
TSH SERPL-MCNC: 2.18 UIU/ML — SIGNIFICANT CHANGE UP (ref 0.35–4.94)
WBC # BLD: 7.16 K/UL — SIGNIFICANT CHANGE UP (ref 3.8–10.5)
WBC # FLD AUTO: 7.16 K/UL — SIGNIFICANT CHANGE UP (ref 3.8–10.5)

## 2020-12-29 PROCEDURE — 70450 CT HEAD/BRAIN W/O DYE: CPT | Mod: 26

## 2020-12-29 PROCEDURE — 93306 TTE W/DOPPLER COMPLETE: CPT | Mod: 26

## 2020-12-29 PROCEDURE — 99233 SBSQ HOSP IP/OBS HIGH 50: CPT

## 2020-12-29 RX ORDER — CHOLECALCIFEROL (VITAMIN D3) 125 MCG
2000 CAPSULE ORAL ONCE
Refills: 0 | Status: COMPLETED | OUTPATIENT
Start: 2020-12-29 | End: 2020-12-29

## 2020-12-29 RX ORDER — AMLODIPINE BESYLATE 2.5 MG/1
10 TABLET ORAL EVERY 24 HOURS
Refills: 0 | Status: DISCONTINUED | OUTPATIENT
Start: 2020-12-29 | End: 2020-12-31

## 2020-12-29 RX ORDER — ATORVASTATIN CALCIUM 80 MG/1
10 TABLET, FILM COATED ORAL AT BEDTIME
Refills: 0 | Status: DISCONTINUED | OUTPATIENT
Start: 2020-12-29 | End: 2020-12-30

## 2020-12-29 RX ADMIN — ATORVASTATIN CALCIUM 10 MILLIGRAM(S): 80 TABLET, FILM COATED ORAL at 22:21

## 2020-12-29 RX ADMIN — Medication 2000 UNIT(S): at 22:21

## 2020-12-29 RX ADMIN — Medication 1 TABLET(S): at 22:23

## 2020-12-29 NOTE — PROGRESS NOTE ADULT - ASSESSMENT
80 y/o male with PMH HTN (on Norvasc 10 mg) and HLD (on Lipitor 10 mg) who self-presented to the ED for acute onset of dizziness since 12:20 PM. NIHSS 4. CTH showing no acute ICH or transcortical infarction. CTP showing no perfusion deficit. CTA showing no LVO or stenosis. Despite negative imaging, tPA administered as 14:56 for focal neurologic deficits of bilateral dysmetria, slurred speech, and left pronator drift. Pt remains in MICU for post-tpa monitoring. Improved neurologic exam with resolution of bilateral dysmetria.      ****************INCOMPLETE************      - NO antiplatelet as post tPA protocol  - Please perform dysphagia screen now   - Once dysphagia screen passed, start Atorvastatin 80 once daily  - Keep BP <180/105  - During infusion, stroke neuro exams every 15 minutes.   - Check stroke neuro checks every 15 minutes for first hour after infusion is stopped; every 30 minutes for the next 6 hours; hourly until 24 hours from end of infusion.   - Repeat CT head with any acute change in mental status.   - Keep temp <100F and maintain glucose 140-180.   - Place order for "Call MD if HR >100 or <55 or SaO2 <95%"  - Minimize arterial and venous punctures  - Maintain strict bed rest for 24 hours with HOB <30 degrees.   - No antiplatelet, anticoagulation, and heparin sq until 24 hour CT head negative for bleed.       - Recommend repeat CT head noncon 24 hours post-TPA to rule out bleed - 12/29/2020 @ 14:56  - Recommend MRI brain without contrast tomorrow  - Recommend TTE w/bubble   - PT/OT order    Raissa Santamaria PA-C    4) DVT ppx - SCDs (NO heparin SQ as post tPA protocol)    Raissa Santamaria PA-C 78 y/o male with PMH HTN (on Norvasc 10 mg) and HLD (on Lipitor 10 mg) who self-presented to the ED for acute onset of dizziness since 12:20 PM. NIHSS 4. CTH showing no acute ICH or transcortical infarction. CTP showing no perfusion deficit. CTA showing no LVO or stenosis. Despite negative imaging, tPA administered as 14:56 for focal neurologic deficits of bilateral dysmetria, slurred speech, and left pronator drift. Pt remains in MICU for post-tpa monitoring. Pt is neurologically stable, with improving bilateral upper extremity ataxia  from admission.     1) Post-tPA protocol  - NO antiplatelet as post tPA protocol  - Please perform dysphagia screen now   - Once dysphagia screen passed, start Atorvastatin 80 once daily  - Keep BP <180/105  - During infusion, stroke neuro exams every 15 minutes.   - Check stroke neuro checks every 15 minutes for first hour after infusion is stopped; every 30 minutes for the next 6 hours; hourly until 24 hours from end of infusion.   - Repeat CT head with any acute change in mental status.   - Keep temp <100F and maintain glucose 140-180.   - Place order for "Call MD if HR >100 or <55 or SaO2 <95%"  - Minimize arterial and venous punctures  - Maintain strict bed rest for 24 hours with HOB <30 degrees.   - No antiplatelet, anticoagulation, and heparin sq until 24 hour CT head negative for bleed.     2) Recommendations   - Repeat CT head noncon 24 hours post-TPA to rule out bleed - 12/29/2020 @ 14:56  - MRI brain without contrast - please give Ativan prior to imaging for patient anxiety  - TTE w/bubble   - PT/OT order  - SCDs for DVT ppx       Raissa Santamaria PA-C

## 2020-12-29 NOTE — PROGRESS NOTE ADULT - PROBLEM SELECTOR PLAN 2
Patient with history of hypertension but now requiring a SBP less than 180 and DBP less than 105 in setting of potential CVA.   -Started Amlodipine for morning of 12/30

## 2020-12-29 NOTE — H&P ADULT - ASSESSMENT
78 y/o male with PMH HTN (on Norvasc 10 mg) and HLD (on Lipitor 10 mg) who self-presented to the ED for acute onset of dizziness since 12:20 PM. NIHSS 4. CTH showing no acute ICH or transcortical infarction. CTP showing no perfusion deficit. CTA showing no LVO or stenosis. Despite negative imaging, tPA administered as 14:56 for focal neurologic deficits of bilateral dysmetria, slurred speech, and left pronator drift. Due to delay in stroke code being called by ED, tPA was outside of door to tpA window of 30 minutes. Pt to be admitted to the MICU for further monitoring and stroke evaluation.     Neuro  #R/o CVA  Presented with dizziness and hand tremors b/l. NIHSS 4. CTH showing no acute ICH or transcortical infarction. CTP showing no perfusion deficit. CTA showing no LVO or stenosis. Despite negative imaging. S/p tPA. Exam at bedside only significant for left sided facial droop.    -See management below    #Post-tPA Monitoring    NO antiplatelet as post tPA protocol  - Failed dysphagia screen in ED. NPO for now. Will repeat in AM   - Once dysphagia screen passed, start Atorvastatin 80 once daily  - Keep BP <180/105  - During infusion, stroke neuro exams every 15 minutes.   - Check stroke neuro checks every 15 minutes for first hour after infusion is stopped; every 30 minutes for the next 6 hours; hourly until 24 hours from end of infusion.   - Repeat CT head with any acute change in mental status.   - Keep temp <100F and maintain glucose 140-180.   - Place order for "Call MD if HR >100 or <55 or SaO2 <95%"  - Minimize arterial and venous punctures  - Maintain strict bed rest for 24 hours with HOB <30 degrees.   - No antiplatelet, anticoagulation, and heparin sq until 24 hour CT head negative for bleed.   - Obtain Hemoglobin A1c, Lipid profile set, and TSH  -  Repeat CT head noncon 24 hours post-TPA to rule out bleed - 12/29/2020 @ 14:56  -  MRI brain without contrast tomorrow  - TTE w/bubble   - PT/OT order  - Stroke education  - DVT ppx - SCDs (NO heparin SQ as post tPA protocol)    CV  #HTN   On Amlodipine 10mg.   -Will hold for now in setting of permissive HTN   -BP goal <180/105    #HLD   On Lipitor 10mg   -Will start Lipitor 80mg once pt passes bedside dysphagia     Pulm   No active issues     GI  No active issues     Renal  No active issues     Endo   No active issues     Heme   No active issues     F: none  E: replete K <4, Mg <2  N: NPO until passes bedside dysphagia   GI Ppx: None   DVT Ppx: SCDs, s/p tPA   Code status: FULL   Dispo: MICU

## 2020-12-29 NOTE — PROGRESS NOTE ADULT - SUBJECTIVE AND OBJECTIVE BOX
OVERNIGHT EVENTS: Bradycardic to 42-45 when asleep, asymptomatic. EKG ordered. BP at goal. Passed bedside dysphagia.     SUBJECTIVE / INTERVAL HPI: Patient seen and examined at bedside. AAOx3. Reports continued dizziness though significant improvement since yesterday.    VITAL SIGNS:  Vital Signs Last 24 Hrs  T(C): 36.7 (29 Dec 2020 14:00), Max: 37.2 (28 Dec 2020 17:58)  T(F): 98 (29 Dec 2020 14:00), Max: 98.9 (28 Dec 2020 17:58)  HR: 64 (29 Dec 2020 15:00) (44 - 88)  BP: 172/72 (29 Dec 2020 15:00) (126/59 - 175/78)  BP(mean): 104 (29 Dec 2020 15:00) (85 - 112)  RR: 22 (29 Dec 2020 15:00) (14 - 38)  SpO2: 97% (29 Dec 2020 15:00) (94% - 98%)    12-28-20 @ 07:01  -  12-29-20 @ 07:00  --------------------------------------------------------  IN: 0 mL / OUT: 875 mL / NET: -875 mL    12-29-20 @ 07:01  -  12-29-20 @ 15:58  --------------------------------------------------------  IN: 0 mL / OUT: 100 mL / NET: -100 mL        PHYSICAL EXAM:    General: WDWN  HEENT: NC/AT; PERRL, anicteric sclera; MMM  Neck: supple  Cardiovascular: +S1/S2; RRR  Respiratory: CTA B/L; no W/R/R  Gastrointestinal: soft, NT/ND; +BSx4  Extremities: WWP; no edema, clubbing or cyanosis  Vascular: 2+ radial, DP/PT pulses B/L  Neurological: AAOx3; no focal deficits    MEDICATIONS:  MEDICATIONS  (STANDING):    MEDICATIONS  (PRN):      ALLERGIES:  Allergies    avocado (Anaphylaxis (Mild); Angioedema (Mod to Severe); Headache; Other)  penicillin (Rash)    Intolerances    statins (Muscle Pain)      LABS:                        13.9   7.16  )-----------( 141      ( 29 Dec 2020 05:07 )             40.4     12-29    140  |  105  |  19  ----------------------------<  86  3.6   |  23  |  0.72    Ca    8.9      29 Dec 2020 05:07    TPro  8.0  /  Alb  4.5  /  TBili  0.6  /  DBili  x   /  AST  19  /  ALT  17  /  AlkPhos  73  12-28    PT/INR - ( 28 Dec 2020 14:41 )   PT: 15.3 sec;   INR: 1.29          PTT - ( 28 Dec 2020 14:41 )  PTT:29.6 sec    CAPILLARY BLOOD GLUCOSE  109 (28 Dec 2020 17:37)      POCT Blood Glucose.: 109 mg/dL (28 Dec 2020 13:51)        RADIOLOGY & ADDITIONAL TESTS: Reviewed.   TRANSFER NOTE: MICU TO [address] [place] [address] COURSE :  80 y/o male with PMH HTN (on Norvasc 10 mg) and HLD (on Lipitor 10 mg) who presented to the ED on 12/28 for acute onset of dizziness since 12:20 PM. NIHSS 4. CTH showing no acute ICH or transcortical infarction. CTP showing no perfusion deficit. CTA showing no LVO or stenosis. Despite negative imaging, tPA administered as 14:56 for focal neurologic deficits of bilateral dysmetria, slurred speech, and left pronator drift. Pt admitted to MICU for post-tpa monitoring. In MICU patient monitored with frequent neuro checks. Passed bedside dysphagia screen. Started on Lipitor 80 mg. Pt is neurologically stable, with improving bilateral upper extremity ataxia from admission. Repeat CTH stable 24 hr post tpa. Patient is now medically stable for stepdown to [facility] for further management.     OVERNIGHT EVENTS: Bradycardic to 42-45 when asleep, asymptomatic. EKG ordered. BP at goal. Passed bedside dysphagia.     SUBJECTIVE / INTERVAL HPI: Patient seen and examined at bedside. AAOx3. Reports continued dizziness though significant improvement since yesterday. Reports dizziness is intermittent room-spinning sensation, worsened with head turning. Of note, patient reports he is unable to lie flat dt dizziness. States that his dizziness is 2/2 to a hernia in his neck. On ROS he denies HA, F/C, SOB, CP, palpitations, N/V, abdominal pain, diarrhea, constipation, dysuria, LE swelling. ROS otherwise negative.     VITAL SIGNS:  Vital Signs Last 24 Hrs  T(C): 36.7 (29 Dec 2020 14:00), Max: 37.2 (28 Dec 2020 17:58)  T(F): 98 (29 Dec 2020 14:00), Max: 98.9 (28 Dec 2020 17:58)  HR: 64 (29 Dec 2020 15:00) (44 - 88)  BP: 172/72 (29 Dec 2020 15:00) (126/59 - 175/78)  BP(mean): 104 (29 Dec 2020 15:00) (85 - 112)  RR: 22 (29 Dec 2020 15:00) (14 - 38)  SpO2: 97% (29 Dec 2020 15:00) (94% - 98%)    12-28-20 @ 07:01  -  12-29-20 @ 07:00  --------------------------------------------------------  IN: 0 mL / OUT: 875 mL / NET: -875 mL    12-29-20 @ 07:01  -  12-29-20 @ 15:58  --------------------------------------------------------  IN: 0 mL / OUT: 100 mL / NET: -100 mL        PHYSICAL EXAM:    General: WDWN. Sitting upright in bed in NAD  HEENT: NC/AT; PERRL, anicteric sclera; MMM, slight left facial droop  Neck: supple  Cardiovascular: +S1/S2; RRR  Respiratory: CTA B/L; no W/R/R  Gastrointestinal: soft, NT/ND; +BSx4  Extremities: WWP; no edema, clubbing or cyanosis  Vascular: 2+ radial, DP/PT pulses B/L  Neurological: AAOx3; CN 2-12 intact. Strength 5/5 in b/l UE and LE. Sensation intact. +dysmetria on finger to nose b/l    MEDICATIONS:  MEDICATIONS  (STANDING):    MEDICATIONS  (PRN):      ALLERGIES:  Allergies    avocado (Anaphylaxis (Mild); Angioedema (Mod to Severe); Headache; Other)  penicillin (Rash)    Intolerances    statins (Muscle Pain)      LABS:                        13.9   7.16  )-----------( 141      ( 29 Dec 2020 05:07 )             40.4     12-29    140  |  105  |  19  ----------------------------<  86  3.6   |  23  |  0.72    Ca    8.9      29 Dec 2020 05:07    TPro  8.0  /  Alb  4.5  /  TBili  0.6  /  DBili  x   /  AST  19  /  ALT  17  /  AlkPhos  73  12-28    PT/INR - ( 28 Dec 2020 14:41 )   PT: 15.3 sec;   INR: 1.29          PTT - ( 28 Dec 2020 14:41 )  PTT:29.6 sec    CAPILLARY BLOOD GLUCOSE  109 (28 Dec 2020 17:37)      POCT Blood Glucose.: 109 mg/dL (28 Dec 2020 13:51)        RADIOLOGY & ADDITIONAL TESTS: Reviewed.

## 2020-12-29 NOTE — PROGRESS NOTE ADULT - SUBJECTIVE AND OBJECTIVE BOX
Neurology Stroke Progress Note    INTERVAL HPI/OVERNIGHT EVENTS: Overnight, patient noted to be bradycardic with heart rate of 42-45 bpm when asleep. EKG ordered and noted to be stable. Patient seen and examined this morning. Pt reports feeling much better. States he still experiences intermittent dizziness/room-spinning sensation that is worse with head turning. Of note, patient reports he cannot lie flat because he "passes out" and states this has been going on for 25 years. As per patient, he believes this happens because he has a hernia in his neck. Pt states he normally sleeps upright when at home. Pt otherwise has no complaints today. Denies headache, tinnitus, chest pain, abdominal pain, nausea, vomiting, extremity numbness, extremity weakness.     Allergies  avocado (Anaphylaxis (Mild); Angioedema (Mod to Severe); Headache; Other)  penicillin (Rash)  Intolerances  statins (Muscle Pain)    ROS: As per HPI, otherwise negative    Vital Signs Last 24 Hrs  T(C): 36.2 (29 Dec 2020 09:01), Max: 37.2 (28 Dec 2020 17:58)  T(F): 97.2 (29 Dec 2020 09:01), Max: 98.9 (28 Dec 2020 17:58)  HR: 53 (29 Dec 2020 09:00) (44 - 90)  BP: 145/67 (29 Dec 2020 09:00) (124/58 - 175/78)  BP(mean): 96 (29 Dec 2020 09:00) (85 - 112)  RR: 30 (29 Dec 2020 09:00) (14 - 38)  SpO2: 96% (29 Dec 2020 09:00) (94% - 98%)    Physical exam:  General: awake and alert, sitting comfortably, no acute distress  Neurologic:  Mental status: awake, alert, oriented to person, place, and time. Speech is fluent, able to name objects. Follows commands. Attention/concentration intact. No dysarthria, no aphasia.  Cranial nerves:   II: visual fields are full to confrontation. pupils equally round and reactive to light,   III, IV, VI: EOMI without nystagmus  V:  V1-V3 sensation intact  VII: slight left facial asymmetry  VIII: gross hearing is intact  Motor: Normal bulk and tone, strength 5/5 in b/l UE and LE,  strength 5/5. No pronator drift  Sensation: intact to light touch. No neglect.  Coordination: No dysmetria on finger to nose bilaterally  Reflexes: downgoing toes bilaterally      LABS:                        13.9   7.16  )-----------( 141      ( 29 Dec 2020 05:07 )             40.4     12-29    140  |  105  |  19  ----------------------------<  86  3.6   |  23  |  0.72    Ca    8.9      29 Dec 2020 05:07    TPro  8.0  /  Alb  4.5  /  TBili  0.6  /  DBili  x   /  AST  19  /  ALT  17  /  AlkPhos  73  12-28    PT/INR - ( 28 Dec 2020 14:41 )   PT: 15.3 sec;   INR: 1.29        PTT - ( 28 Dec 2020 14:41 )  PTT:29.6 sec   Neurology Stroke Progress Note    INTERVAL HPI/OVERNIGHT EVENTS: Overnight, patient noted to be bradycardic with heart rate of 42-45 bpm when asleep. EKG ordered and noted to be stable. Patient seen and examined this morning. Pt reports feeling much better. States he still experiences intermittent dizziness/room-spinning sensation that is worse with head turning. Of note, patient reports he cannot lie flat because he "passes out" and states this has been going on for 25 years. As per patient, he believes this happens because he has a hernia in his neck. Pt states he normally sleeps upright when at home. Pt otherwise has no complaints today. Denies headache, tinnitus, chest pain, abdominal pain, nausea, vomiting, extremity numbness, extremity weakness.     Allergies  avocado (Anaphylaxis (Mild); Angioedema (Mod to Severe); Headache; Other)  penicillin (Rash)  Intolerances  statins (Muscle Pain)    ROS: As per HPI, otherwise negative    Vital Signs Last 24 Hrs  T(C): 36.2 (29 Dec 2020 09:01), Max: 37.2 (28 Dec 2020 17:58)  T(F): 97.2 (29 Dec 2020 09:01), Max: 98.9 (28 Dec 2020 17:58)  HR: 53 (29 Dec 2020 09:00) (44 - 90)  BP: 145/67 (29 Dec 2020 09:00) (124/58 - 175/78)  BP(mean): 96 (29 Dec 2020 09:00) (85 - 112)  RR: 30 (29 Dec 2020 09:00) (14 - 38)  SpO2: 96% (29 Dec 2020 09:00) (94% - 98%)    Physical exam:  General: awake and alert, sitting comfortably, no acute distress  Neurologic:  Mental status: awake, alert, oriented to person, place, and time. Speech is fluent, able to name objects. Follows commands. Attention/concentration intact. No dysarthria, no aphasia.  Cranial nerves:   II: visual fields are full to confrontation. pupils equally round and reactive to light,   III, IV, VI: EOMI without nystagmus  V:  V1-V3 sensation intact  VII: slight left facial asymmetry  VIII: gross hearing is intact  Motor: Normal bulk and tone, strength 5/5 in b/l UE and LE,  strength 5/5. No pronator drift  Sensation: intact to light touch. No neglect.  Coordination: Dysmetria on finger to nose bilaterally, right upper extremity more ataxic, but overall dysmetria improved from admission  Reflexes: downgoing toes bilaterally      LABS:                        13.9   7.16  )-----------( 141      ( 29 Dec 2020 05:07 )             40.4     12-29    140  |  105  |  19  ----------------------------<  86  3.6   |  23  |  0.72    Ca    8.9      29 Dec 2020 05:07    TPro  8.0  /  Alb  4.5  /  TBili  0.6  /  DBili  x   /  AST  19  /  ALT  17  /  AlkPhos  73  12-28    PT/INR - ( 28 Dec 2020 14:41 )   PT: 15.3 sec;   INR: 1.29        PTT - ( 28 Dec 2020 14:41 )  PTT:29.6 sec

## 2020-12-29 NOTE — H&P ADULT - NSHPPHYSICALEXAM_GEN_ALL_CORE
Physical exam:  General: No acute distress, awake and alert  Cardiovascular: Regular rate and rhythm, no murmurs, rubs, or gallops. No bruits  Pulmonary: Anterior breath sounds clear bilaterally, no crackles or wheezing. No use of accessory muscles  Extremities: Radial and DP pulses +2, no edema    Neurologic:  -Mental status: Awake, alert, oriented to person, place, and time. Speech is fluent with intact naming, repetition, and comprehension, no dysarthria. Recent and remote memory intact. Follows commands. Attention/concentration intact. Fund of knowledge appropriate.  -Cranial nerves:   II: Visual fields are full to confrontation.  III, IV, VI: Extraocular movements are intact without nystagmus. Pupils equally round and reactive to light  V:  Facial sensation V1-V3 equal and intact   VII: left sided facial droop   VIII: Hearing is bilaterally intact to finger rub  IX, X: Uvula is midline and soft palate rises symmetrically  XI: Head turning and shoulder shrug are intact.  XII: Tongue protrudes midline  Motor: Normal bulk and tone. No pronator drift. Strength bilateral upper extremity 5/5, bilateral lower extremities 5/5.  Sensation: Intact to light touch bilaterally. No neglect or extinction on double simultaneous testing.  Coordination: b/l dysmetria

## 2020-12-29 NOTE — PROGRESS NOTE ADULT - SUBJECTIVE AND OBJECTIVE BOX
Transfer Acceptance Note From MICU to [facility]: 78 y/o male with PMH HTN (on Norvasc 10 mg) and HLD (on Lipitor 10 mg) who presented to the ED on 12/28 for acute onset of dizziness since 12:20 PM. NIHSS 4. CTH showing no acute ICH or transcortical infarction. CTP showing no perfusion deficit. CTA showing no LVO or stenosis. Despite negative imaging, tPA administered as 14:56 for focal neurologic deficits of bilateral dysmetria, slurred speech, and left pronator drift. Pt admitted to MICU for post-tpa monitoring. In MICU patient monitored with frequent neuro checks. Passed bedside dysphagia screen. Started on Lipitor 80 mg. Pt is neurologically stable, with improving bilateral upper extremity ataxia from admission. Repeat CTH stable 24 hr post tpa. Patient is now medically stable for stepdown to [facility] for further management.     O/N Events:  Bradycardic to 42-45 when asleep, asymptomatic. EKG ordered. BP at goal. Passed bedside dysphagia.     Subjective/ROS:  Patient seen and examined at the bedside.  He appears disgruntled and complaining that he will be unable to sit for MRI tomorrow as he is claustrophobic.  However he denies HA, CP, SOB, n/v, changes in bowel/urinary habits.  12pt ROS otherwise negative.    VITALS  Vital Signs Last 24 Hrs  T(C): 36.9 (29 Dec 2020 18:16), Max: 36.9 (29 Dec 2020 18:16)  T(F): 98.5 (29 Dec 2020 18:16), Max: 98.5 (29 Dec 2020 18:16)  HR: 58 (29 Dec 2020 19:18) (44 - 69)  BP: 169/77 (29 Dec 2020 19:18) (129/60 - 172/72)  BP(mean): 111 (29 Dec 2020 19:18) (87 - 111)  RR: 22 (29 Dec 2020 19:18) (15 - 31)  SpO2: 98% (29 Dec 2020 19:18) (94% - 98%)    CAPILLARY BLOOD GLUCOSE      PHYSICAL EXAM  General: WDWN. Sitting upright in bed in NAD  HEENT: NC/AT; PERRL, anicteric sclera; MMM, slight left facial droop  Neck: supple  Cardiovascular: +S1/S2; RRR  Respiratory: CTA B/L; no W/R/R  Gastrointestinal: soft, NT/ND; +BSx4  Extremities: WWP; no edema, clubbing or cyanosis  Vascular: 2+ radial, DP/PT pulses B/L    Neurologic:  Mental status: awake, alert, oriented to person, place, and time. Speech is fluent, able to name objects. Follows commands. Attention/concentration intact. No dysarthria, no aphasia.  Cranial nerves:   II: visual fields are full to confrontation. Pupils equally round and reactive to light,   III, IV, VI: EOMI without nystagmus  V:  V1-V3 sensation intact  VII: slight left facial asymmetry  VIII: gross hearing is intact  Motor: Normal bulk and tone, strength 5/5 in b/l UE and LE,  strength 5/5. No pronator drift  Sensation: intact to light touch. No neglect.  Coordination: Dysmetria on finger to nose bilaterally, right upper extremity more ataxic, but overall dysmetria improved from admission  Reflexes: downgoing toes bilaterally      MEDICATIONS  (STANDING):  amLODIPine   Tablet 10 milliGRAM(s) Oral every 24 hours  atorvastatin 10 milliGRAM(s) Oral at bedtime  cholecalciferol 2000 Unit(s) Oral once  multivitamin 1 Tablet(s) Oral daily    MEDICATIONS  (PRN):      avocado (Anaphylaxis (Mild); Angioedema (Mod to Severe); Headache; Other)  penicillin (Rash)  statins (Muscle Pain)      LABS                        13.9   7.16  )-----------( 141      ( 29 Dec 2020 05:07 )             40.4     12-29    140  |  105  |  19  ----------------------------<  86  3.6   |  23  |  0.72    Ca    8.9      29 Dec 2020 05:07    TPro  8.0  /  Alb  4.5  /  TBili  0.6  /  DBili  x   /  AST  19  /  ALT  17  /  AlkPhos  73  12-28    PT/INR - ( 28 Dec 2020 14:41 )   PT: 15.3 sec;   INR: 1.29          PTT - ( 28 Dec 2020 14:41 )  PTT:29.6 sec    CARDIAC MARKERS ( 28 Dec 2020 14:41 )  x     / <0.01 ng/mL / x     / x     / x

## 2020-12-29 NOTE — PROGRESS NOTE ADULT - ASSESSMENT
80 y/o male with PMH HTN (on Norvasc 10 mg) and HLD (on Lipitor 10 mg) who self-presented to the ED for acute onset of dizziness since 12:20 PM on 12/28. NIHSS 4. CTH showing no acute ICH or transcortical infarction. CTP showing no perfusion deficit. CTA showing no LVO or stenosis. Despite negative imaging, tPA administered as 14:56 on 12/28 for focal neurologic deficits of bilateral dysmetria, slurred speech, and left pronator drift. Pt admitted to MICU for post-tpa monitoring. Pt is neurologically stable and to be stepped down to [facility] for further management.    Neuro  #R/o CVA  Presented with dizziness and hand tremors b/l. NIHSS 4. CTH showing no acute ICH or transcortical infarction. CTP showing no perfusion deficit. CTA showing no LVO or stenosis. Despite negative imaging. S/p tPA. Exam at bedside only significant for left sided facial droop and +dysmetria.     -See management below    #Post-tPA Monitoring   - Repeat CT head with any acute change in mental status.   - c/w Neurochecks q6h  - Keep temp <100F and maintain glucose 140-180.   - Place order for "Call MD if HR >100 or <55 or SaO2 <95%"  - Minimize arterial and venous punctures  - Maintain strict bed rest for 24 hours with HOB <30 degrees.   - No antiplatelet, anticoagulation, and heparin sq until 24 hour CT head negative for bleed.   - f/u Hemoglobin A1c, Lipid profile set, and TSH  - f/u MRI brain   - TTE w/bubble   - PT/OT order  - Stroke education  - DVT ppx - SCDs (NO heparin SQ as post tPA protocol)  - dysphagia screen passed  - c/w Atorvastatin 80 once daily    CV  #HTN   On Amlodipine 10mg.   -Will hold for now in setting of permissive HTN   -BP goal <180/105    #HLD   On Lipitor 10mg   - c/w Atorvastatin 80 once daily    Pulm   No active issues     GI  No active issues     Renal  No active issues     Endo   No active issues     Heme   No active issues     F: none  E: replete K <4, Mg <2  N: DASH/TLC  GI Ppx: None   DVT Ppx: SCDs, s/p tPA   Code status: FULL   Dispo: MICU to [facility]

## 2020-12-29 NOTE — PROGRESS NOTE ADULT - ASSESSMENT
80 y/o male with PMH HTN (on Norvasc 10 mg) and HLD (on Lipitor 10 mg) who self-presented to the ED for acute onset of dizziness since 12:20 PM on 12/28. NIHSS 4. CTH showing no acute ICH or transcortical infarction. CTP showing no perfusion deficit. CTA showing no LVO or stenosis. Despite negative imaging, tPA administered as 14:56 on 12/28 for focal neurologic deficits of bilateral dysmetria, slurred speech, and left pronator drift. Pt admitted to MICU for post-tpa monitoring. Pt is neurologically stable and to be stepped down to [facility] for further management

## 2020-12-29 NOTE — CONSULT NOTE ADULT - ASSESSMENT
per Neurology    78 y/o male with PMH HTN (on Norvasc 10 mg) and HLD (on Lipitor 10 mg) who self-presented to the ED for acute onset of dizziness since 12:20 PM. NIHSS 4. CTH showing no acute ICH or transcortical infarction. CTP showing no perfusion deficit. CTA showing no LVO or stenosis. Despite negative imaging, tPA administered as 14:56 for focal neurologic deficits of bilateral dysmetria, slurred speech, and left pronator drift. Due to delay in stroke code being called by ED, tPA was outside of door to tpA window of 30 minutes. Pt to be admitted to the MICU for further monitoring and stroke evaluation.     1) Admit to MICU for post tPA monitoring  - NO antiplatelet as post tPA protocol  - Please perform dysphagia screen now   - Once dysphagia screen passed, start Atorvastatin 80 once daily  - Keep BP <180/105  - During infusion, stroke neuro exams every 15 minutes.   - Check stroke neuro checks every 15 minutes for first hour after infusion is stopped; every 30 minutes for the next 6 hours; hourly until 24 hours from end of infusion.   - Repeat CT head with any acute change in mental status.   - Keep temp <100F and maintain glucose 140-180.   - Place order for "Call MD if HR >100 or <55 or SaO2 <95%"  - Minimize arterial and venous punctures  - Maintain strict bed rest for 24 hours with HOB <30 degrees.   - No antiplatelet, anticoagulation, and heparin sq until 24 hour CT head negative for bleed.     2) Labs: Obtain Hemoglobin A1c, Lipid profile set, and TSH    3) Other tests:  - Recommend repeat CT head noncon 24 hours post-TPA to rule out bleed - 12/29/2020 @ 14:56  - Recommend MRI brain without contrast tomorrow  - Recommend TTE w/bubble   - PT/OT order  - Stroke education    4) DVT ppx - SCDs (NO heparin SQ as post tPA protocol)

## 2020-12-29 NOTE — PROGRESS NOTE ADULT - PROBLEM SELECTOR PLAN 1
Presented with dizziness and hand tremors b/l. NIHSS 4. CTH showing no acute ICH or transcortical infarction. CTP showing no perfusion deficit. CTA showing no LVO or stenosis. Despite negative imaging. S/p tPA. Exam at bedside only significant for left sided facial droop and +dysmetria.     -Repeat CT head with any acute change in mental status.   -c/w Neurochecks q6h  -Maintain strict bed rest for 24 hours with HOB <30 degrees.   -Will reach out to Neuro PA if okay to begin antiplatelet, anticoagulation, now that it has been greater than 24 hours since CT head showing no hemorrhage  -MRI brain completed 12/29  -TTE w/bubble completed 12/29  -Stroke education  -DVT ppx - SCDs (NO heparin SQ as post tPA protocol) will reach out to Neuro PA if okay to start heparin  -dysphagia screen passed  -c/w Atorvastatin 80mg daily

## 2020-12-29 NOTE — H&P ADULT - NSHPLABSRESULTS_GEN_ALL_CORE
LABS:                         14.3   7.14  )-----------( 158      ( 28 Dec 2020 14:41 )             41.1     12-28    139  |  104  |  25<H>  ----------------------------<  101<H>  3.8   |  23  |  1.05    Ca    9.3      28 Dec 2020 14:41    TPro  8.0  /  Alb  4.5  /  TBili  0.6  /  DBili  x   /  AST  19  /  ALT  17  /  AlkPhos  73  12-28    PT/INR - ( 28 Dec 2020 14:41 )   PT: 15.3 sec;   INR: 1.29          PTT - ( 28 Dec 2020 14:41 )  PTT:29.6 sec    CARDIAC MARKERS ( 28 Dec 2020 14:41 )  x     / <0.01 ng/mL / x     / x     / x                RADIOLOGY, EKG & ADDITIONAL TESTS: Reviewed

## 2020-12-29 NOTE — H&P ADULT - HISTORY OF PRESENT ILLNESS
80 y/o male with PMH HTN (on Norvasc 10 mg) and HLD (on Lipitor 10 mg) who self-presented to the ED for acute onset of dizziness since 12:20 PM. Pt reports he was watching television when he suddenly felt dizzy, as if the room was spinning. Pt reports he tried to stand up and drink some water, but felt off-balance and like he was about to pass out. Pt states he was able to drink some water, but that did not help with his symptoms. Of note, patient reports experiencing similar dizziness 3 days ago, but states his symptoms self-resolved within a few hours. There is questionable history of vertigo - patient is unsure. NIHSS 4. In the ED, patient complained of persistent room-spinning sensation and was very anxious to lie flat due to fear of "passing out". After head and neck imaging, patient presented with tremors in both arms. Pt otherwise denies headache, blurry vision, chest pain, palpitations, syncope, extremity weakness, extremity numbness, nausea, vomiting, tinnitus.  Last known well time/Time of onset of symptoms: 12:20 on 12/28/2020    ED Course   VS: 97.7, 90, 158/78, 18, 97%  Labs: PT 15.3, INR 1.29,  BUN 25, Glu 101,   Imaging: CTH showing no acute ICH or transcortical infarction. CTP showing no perfusion deficit. CTA showing no LVO or stenosis.   Received: Despite negative imaging, tPA administered as 14:56 for focal neurologic deficits of bilateral dysmetria, slurred speech, and left pronator drift.

## 2020-12-30 LAB
ANION GAP SERPL CALC-SCNC: 12 MMOL/L — SIGNIFICANT CHANGE UP (ref 5–17)
BUN SERPL-MCNC: 23 MG/DL — SIGNIFICANT CHANGE UP (ref 7–23)
CALCIUM SERPL-MCNC: 9.6 MG/DL — SIGNIFICANT CHANGE UP (ref 8.4–10.5)
CHLORIDE SERPL-SCNC: 102 MMOL/L — SIGNIFICANT CHANGE UP (ref 96–108)
CO2 SERPL-SCNC: 22 MMOL/L — SIGNIFICANT CHANGE UP (ref 22–31)
CREAT SERPL-MCNC: 0.8 MG/DL — SIGNIFICANT CHANGE UP (ref 0.5–1.3)
GLUCOSE BLDC GLUCOMTR-MCNC: 96 MG/DL — SIGNIFICANT CHANGE UP (ref 70–99)
GLUCOSE SERPL-MCNC: 139 MG/DL — HIGH (ref 70–99)
HCT VFR BLD CALC: 43.9 % — SIGNIFICANT CHANGE UP (ref 39–50)
HGB BLD-MCNC: 15.5 G/DL — SIGNIFICANT CHANGE UP (ref 13–17)
MAGNESIUM SERPL-MCNC: 2.3 MG/DL — SIGNIFICANT CHANGE UP (ref 1.6–2.6)
MCHC RBC-ENTMCNC: 30.9 PG — SIGNIFICANT CHANGE UP (ref 27–34)
MCHC RBC-ENTMCNC: 35.3 GM/DL — SIGNIFICANT CHANGE UP (ref 32–36)
MCV RBC AUTO: 87.5 FL — SIGNIFICANT CHANGE UP (ref 80–100)
NRBC # BLD: 0 /100 WBCS — SIGNIFICANT CHANGE UP (ref 0–0)
PHOSPHATE SERPL-MCNC: 3.6 MG/DL — SIGNIFICANT CHANGE UP (ref 2.5–4.5)
PLATELET # BLD AUTO: 159 K/UL — SIGNIFICANT CHANGE UP (ref 150–400)
POTASSIUM SERPL-MCNC: 3.6 MMOL/L — SIGNIFICANT CHANGE UP (ref 3.5–5.3)
POTASSIUM SERPL-SCNC: 3.6 MMOL/L — SIGNIFICANT CHANGE UP (ref 3.5–5.3)
RBC # BLD: 5.02 M/UL — SIGNIFICANT CHANGE UP (ref 4.2–5.8)
RBC # FLD: 12.6 % — SIGNIFICANT CHANGE UP (ref 10.3–14.5)
SODIUM SERPL-SCNC: 136 MMOL/L — SIGNIFICANT CHANGE UP (ref 135–145)
WBC # BLD: 8.67 K/UL — SIGNIFICANT CHANGE UP (ref 3.8–10.5)
WBC # FLD AUTO: 8.67 K/UL — SIGNIFICANT CHANGE UP (ref 3.8–10.5)

## 2020-12-30 PROCEDURE — 99233 SBSQ HOSP IP/OBS HIGH 50: CPT

## 2020-12-30 PROCEDURE — 99254 IP/OBS CNSLTJ NEW/EST MOD 60: CPT

## 2020-12-30 PROCEDURE — 70450 CT HEAD/BRAIN W/O DYE: CPT | Mod: 26

## 2020-12-30 RX ORDER — MECLIZINE HCL 12.5 MG
12.5 TABLET ORAL THREE TIMES A DAY
Refills: 0 | Status: DISCONTINUED | OUTPATIENT
Start: 2020-12-30 | End: 2020-12-31

## 2020-12-30 RX ORDER — ATORVASTATIN CALCIUM 80 MG/1
20 TABLET, FILM COATED ORAL AT BEDTIME
Refills: 0 | Status: DISCONTINUED | OUTPATIENT
Start: 2020-12-30 | End: 2020-12-31

## 2020-12-30 RX ADMIN — AMLODIPINE BESYLATE 10 MILLIGRAM(S): 2.5 TABLET ORAL at 07:16

## 2020-12-30 RX ADMIN — ATORVASTATIN CALCIUM 20 MILLIGRAM(S): 80 TABLET, FILM COATED ORAL at 22:45

## 2020-12-30 RX ADMIN — Medication 1 TABLET(S): at 11:09

## 2020-12-30 NOTE — PHYSICAL THERAPY INITIAL EVALUATION ADULT - ADDITIONAL COMMENTS
Patient reports that he lives with a domestic partner in an elevator building. Denies having BELEN. Reports being an independent community ambulator at baseline.

## 2020-12-30 NOTE — CHART NOTE - NSCHARTNOTEFT_GEN_A_CORE
Goals reviewed at interdisciplinary rounds with case management, social work, physical therapy, occupational therapy, and speech language pathology.    Please see specific therapy  notes for in depth goals.    Highlights of goals are:    Patient will:   Physical therapy  [] remain on bedrest  [] get out of bed to chair [] ambulate with assistance [x] ambulate without assistance  []today       [x] by discharge    Occupational therapy - PENDING INITIAL EVAL  [] N/a, independent [] balance training  []go from supine to seated independently [] balance sitting at the edge of the bed to do grooming task []stand at sink to perform grooming task  [] dress self   [] gain strength to feed self  [] other:  []today         []by discharge    Speech therapy  [x] N/a, no speech deficit [] vocalize [] respond to yes/no questions given cues with 80% accuracy [] name common objects [] express basic needs/wants [] improve enunciation of words and phrases []other:  [x]today        [] by discharge      Swallow therapy  [x] tolerate regular diet [] remain NPO, receive oral care to minimize aspirating bacteria mouth bacteria [] tolerate pureed diet  [] tolerate mechanical soft diet  [] tolerate tube feeds [] tolerate thin liquids [] tolerate nectar thick liquids [] other:  [] with assistance  [x] today       [] by discharge      Pt remains clinically active, is pending MRI and Echo as part of stroke evaluation. Pt likely able to go home with no skilled needs upon discharge.

## 2020-12-30 NOTE — PROGRESS NOTE ADULT - PROBLEM SELECTOR PLAN 4
F: none  E: replete K <4, Mg <2  N: DASH/TLC  GI Ppx: None   DVT Ppx: SCDs, s/p tPA   Code status: FULL   Dispo:7lach
F: none  E: replete K <4, Mg <2  N: DASH/TLC  GI Ppx: None   DVT Ppx: SCDs, s/p tPA   Code status: FULL   Dispo:7lach

## 2020-12-30 NOTE — DIETITIAN INITIAL EVALUATION ADULT. - OTHER INFO
80 yo/male with PMHx HTN HLD, admitted w/dizziness, L. facial droop, weakness. CTH/A/P negative. S/p tPA. S/p MRI brain and TTE w/bubble on 12/29. Pt seen in room, awake, alert, very pleasant. He endorsed eating well at home PTA, tries to eat healthy/balanced diet at home w/fish, vegetables, eggs, and fruit. Confirmed allergy to avocado. Observed ~75% intake of breakfast tray this AM. No N/V/C/D reported at this time. +BM 12/29. He denied difficulty chewing or swallowing. Skin intact pressure-wise; no edema present. No pain or headaches, but did report that he was still experiencing some dizziness when getting out of bed. Afebrile. Encouraged PO intake and encouraged him to continue to eat same foods that he has been eating at home. Will continue to follow per RD protocol.

## 2020-12-30 NOTE — OCCUPATIONAL THERAPY INITIAL EVALUATION ADULT - PERTINENT HX OF CURRENT PROBLEM, REHAB EVAL
Patient is an 78 y/o male with PMH HTN (on Norvasc 10 mg) and HLD (on Lipitor 10 mg) who self-presented to the ED for acute onset of dizziness since 12:20 PM on 12/28. NIHSS 4. CTH showing no acute ICH or transcortical infarction. CTP showing no perfusion deficit. CTA showing no LVO or stenosis. Despite negative imaging, tPA administered as 14:56 on 12/28 for focal neurologic deficits of bilateral dysmetria, slurred speech, and left pronator drift.

## 2020-12-30 NOTE — OCCUPATIONAL THERAPY INITIAL EVALUATION ADULT - ADDITIONAL COMMENTS
Patient reports he lives with a best friend in an elevator apt +no BELEN, has a bathtub and does not own any DMEs. Patient reports to be independent in all ADLs and functional transfers/ambulation prior to this hospitalization.

## 2020-12-30 NOTE — PHYSICAL THERAPY INITIAL EVALUATION ADULT - LEVEL OF INDEPENDENCE: STAND/SIT, REHAB EVAL
Patient's mother given all discharge paperwork and antipyretic guidleline sheet and all questions answered and phone numbers highlighted. Patient seen ambulating out of the ED with steady gait and all belongings. Patient in no apparent distress at this time.
independent

## 2020-12-30 NOTE — DIETITIAN INITIAL EVALUATION ADULT. - OTHER CALCULATIONS
ActualBW used for calculations as pt between % of IBW (117% IBW). Needs estimated for age and adjusted for r/o CVA

## 2020-12-30 NOTE — PHYSICAL THERAPY INITIAL EVALUATION ADULT - MANUAL MUSCLE TESTING RESULTS, REHAB EVAL
MMT (B) 5/5 for each of the following: hip flex, knee flex, knee ext, ankle DF, ankle PF, shoulder flex, elbow flex, .

## 2020-12-30 NOTE — OCCUPATIONAL THERAPY INITIAL EVALUATION ADULT - COORDINATION ASSESSED, REHAB EVAL
2/5 accuracy on LUE; 3/5 accuracy on RUE +dysmetric on BUE. Ataxia also noted when asked to mimic/copy hand signs/finger to nose

## 2020-12-30 NOTE — PHYSICAL THERAPY INITIAL EVALUATION ADULT - PERTINENT HX OF CURRENT PROBLEM, REHAB EVAL
79M who self-presented to the ED for acute onset of dizziness since 12:20 PM on 12/28. NIHSS 4. CTH showing no acute ICH or transcortical infarction. CTP showing no perfusion deficit. CTA showing no LVO or stenosis. S/P TPA, repeat head CT clear.

## 2020-12-30 NOTE — OCCUPATIONAL THERAPY INITIAL EVALUATION ADULT - GENERAL OBSERVATIONS, REHAB EVAL
Patient received seated in bedside chair +tele +heplock, in NAD. Patient is A&Ox3, able to follow multi-steps commands.

## 2020-12-30 NOTE — CONSULT NOTE ADULT - ASSESSMENT
79 year old male with,    # Dizziness: seems to be a chronic problem. R/o CVA, s/p tPA. Repeat HCT done today is negative for acute intracranial hemorrhage or infarct. MADHURI shows EF of 63%, mild LVH, grade I diastolic dysfunction. No e/o R to L shunt. Pt doesn't want to get an MRI d/t anxiety, might need to be fully sedated or trial of ativan prior to. Trial of meclizine.     # HTN: SBP goal per Stroke <180. Holding home dose of norvasc at this time.    # Hyperlipidemia: c/w statin.    # Thrombocytopenia: resolved.    # Pre-diabetes: with A1C of 5.7. Monitor BS for now.    Thanks for the consult. D/w Stroke Team, will continue to follow up with you.  D/w Daughter at bedside.        79 year old male with,    # Dizziness: seems to be a chronic problem. R/o CVA, s/p tPA 12/28. Repeat HCT done today is negative for acute intracranial hemorrhage or infarct. MADHURI shows EF of 63%, mild LVH, grade I diastolic dysfunction. No e/o R to L shunt. Pt doesn't want to get an MRI d/t anxiety, might need to be fully sedated or trial of ativan prior to. Trial of meclizine. C/w statin. PT/OT evals --> no need. DVT PPx with SCD's for now.    # HTN: SBP goal per Stroke <180. Holding home dose of norvasc at this time.    # Hyperlipidemia: c/w statin.    # Thrombocytopenia: resolved.    # Pre-diabetes: with A1C of 5.7. Monitor BS for now.    Thanks for the consult. D/w Stroke Team, will continue to follow up with you.  D/w Daughter at bedside.

## 2020-12-30 NOTE — PROGRESS NOTE ADULT - PROBLEM SELECTOR PLAN 3
Patient with history of hyperlipidemia on 10mg of Lipitor at home but given concern of CVA patient will require a higher dose.   -Atorvastatin 80mg daily
Patient with history of hyperlipidemia on 10mg of Lipitor at home but given concern of CVA patient will require a higher dose.   -Atorvastatin 80mg daily

## 2020-12-30 NOTE — CONSULT NOTE ADULT - SUBJECTIVE AND OBJECTIVE BOX
Patient is a 79y old  Male who presents with a chief complaint of r/o CVA, s/p tPA (29 Dec 2020 02:14)       HPI:  80 y/o male with PMH HTN (on Norvasc 10 mg) and HLD (on Lipitor 10 mg) who self-presented to the ED for acute onset of dizziness since 12:20 PM. Pt reports he was watching television when he suddenly felt dizzy, as if the room was spinning. Pt reports he tried to stand up and drink some water, but felt off-balance and like he was about to pass out. Pt states he was able to drink some water, but that did not help with his symptoms. Of note, patient reports experiencing similar dizziness 3 days ago, but states his symptoms self-resolved within a few hours. There is questionable history of vertigo - patient is unsure. NIHSS 4. In the ED, patient complained of persistent room-spinning sensation and was very anxious to lie flat due to fear of "passing out". After head and neck imaging, patient presented with tremors in both arms. Pt otherwise denies headache, blurry vision, chest pain, palpitations, syncope, extremity weakness, extremity numbness, nausea, vomiting, tinnitus.  Last known well time/Time of onset of symptoms: 12:20 on 12/28/2020    ED Course   VS: 97.7, 90, 158/78, 18, 97%  Labs: PT 15.3, INR 1.29,  BUN 25, Glu 101,   Imaging: CTH showing no acute ICH or transcortical infarction. CTP showing no perfusion deficit. CTA showing no LVO or stenosis.   Received: Despite negative imaging, tPA administered as 14:56 for focal neurologic deficits of bilateral dysmetria, slurred speech, and left pronator drift.   (29 Dec 2020 02:14)      PAST MEDICAL & SURGICAL HISTORY:  HTN (hypertension)    No significant past surgical history        MEDICATIONS  (STANDING):    MEDICATIONS  (PRN):      FAMILY HISTORY:      CBC Full  -  ( 29 Dec 2020 05:07 )  WBC Count : 7.16 K/uL  RBC Count : 4.64 M/uL  Hemoglobin : 13.9 g/dL  Hematocrit : 40.4 %  Platelet Count - Automated : 141 K/uL  Mean Cell Volume : 87.1 fl  Mean Cell Hemoglobin : 30.0 pg  Mean Cell Hemoglobin Concentration : 34.4 gm/dL  Auto Neutrophil # : x  Auto Lymphocyte # : x  Auto Monocyte # : x  Auto Eosinophil # : x  Auto Basophil # : x  Auto Neutrophil % : x  Auto Lymphocyte % : x  Auto Monocyte % : x  Auto Eosinophil % : x  Auto Basophil % : x      12-29    140  |  105  |  19  ----------------------------<  86  3.6   |  23  |  0.72    Ca    8.9      29 Dec 2020 05:07    TPro  8.0  /  Alb  4.5  /  TBili  0.6  /  DBili  x   /  AST  19  /  ALT  17  /  AlkPhos  73  12-28            Radiology:    < from: Xray Chest 2 Views PA/Lat (12.27.19 @ 09:52) >    EXAM:  XR CHEST PA LAT 2V                          PROCEDURE DATE:  12/27/2019          INTERPRETATION:  Clinical History: Cough    Frontal and lateral examination of the chest demonstrates mild cardiomegaly. No acute infiltrates. Calcification noted involving thoracic aorta. Mild dextroscoliosis thoracic spine with degenerative changes. Mild discoid changes lung bases      Impression: No acute infiltrates      < from: CT Brain Stroke Protocol (12.28.20 @ 14:37) >  EXAM:  CT BRAIN STROKE PROTOCOL                          PROCEDURE DATE:  12/28/2020          INTERPRETATION:  INDICATIONS: Dizziness, unsteady gate, unresolved.    TECHNIQUE: Serial axial images were obtained from the skull base to the vertex without the use of intravenous contrast. Sagittal and coronal images were reformatted. RAPID-Telogis software was utilized for preliminary hemorrhage detection.    Patient unable to lay flat for exam, with the head in a flexed position.    COMPARISON EXAMINATION: CT head from 6/28/2015.    FINDINGS:    Study is also limited by patient motion, especially at the level of the skull base, posterior fossa and inferior frontal and temporal lobes. Also, the inferior frontal lobes and anterior-inferior limit of the right temporal lobe are omitted from view.    Please note that RAPID erroneously reported 2 sites of "suspected hemorrhage" on series 720,, image 1.    VENTRICLES AND SULCI: Parenchymal volume is commensurate with patient age, with slight progression of volume loss comparing back to 2015. No hydrocephalus.  INTRA-AXIAL: No intracranial mass, acute hemorrhage, midline shift or acute transcortical infarct is seen given motion limitation of the study. Scattered areas of hypodensity in the periventricular and subcortical white matter consistent with microangiopathic disease, mild in degree and does not majorly changed given motion artifact.  EXTRA-AXIAL: No extra-axial fluid collection is present given motion limitation of the study.  VISUALIZEDSINUSES: Partially imaged sinuses are clear.  VISUALIZED MASTOIDS: Clear.  CALVARIUM: No fracture, although the anterior skull base and inferior frontal bones are omitted from view.    IMPRESSION:    Motion limited exam.    No large/visible acute intracranial hemorrhage, mass effect or definite transcortical infarction. Consider repeat exam or MR if there is continued concern for recent cerebrovascular event.        < from: CT Perfusion w/ Maps w/ IV Cont (12.28.20 @ 14:38) >  EXAM:  CT PERFUSION W MAPS IC                          PROCEDURE DATE:  12/28/2020          INTERPRETATION:  CT perfusion    INDICATION: Dizziness, unresolved.    TECHNIQUE: After the bolus administration of 40 mL of Optiray-350, CT perfusion is obtained as per [facility] RAPID protocol. Perfusion maps are provided.    COMPARISON: None.    FINDINGS:  On CT perfusion, there is no reported defect in CBF <30% or region of elevated Tmax > 6 seconds, nor mismatch in volume thereof. Please note study may be limited by patient motion.      IMPRESSION:    No reported perfusion deficit or Tmax>6 seconds given motion limitation.      < from: CT Angio Head w/ IV Cont (12.28.20 @ 14:38) >  EXAM:  CT ANGIO NECK (W)AW IC                          EXAM:  CT ANGIO BRAIN (W)AW IC                          PROCEDURE DATE:  12/28/2020          INTERPRETATION:  PROCEDURE: CTA brain with intravenous contrast.    INDICATION: Dizziness, unresolved.    TECHNIQUE: Multiple axial thin section were obtained through the [place] [facility] [place] following the intravenous bolus injection of 80ml  Optiray-350. MIP series are provided.    COMPARISON: None.    FINDINGS:    Exam is mildly limited by patient motion.    The internal carotid arteries are patent at the skull base and intracranial compartment without occlusion or high grade stenosis. Mild calcified plaque at the cavernous portions of the bilateral ICAs.    The anterior and middle cerebral arteries are patent at their 1st and 2nd order segments. The right A1 segment is not well seen, either aplastic versus severely hypoplastic.    The intracranial vertebral arteries, the basilar artery and both posterior cerebral arteries are patent, althoughthere is questionable narrowing of the proximal bilateral posterior cerebral arteries which may be a manifestation of motion artifact. For reference, this is questioned on the left on coronal MIP image 36. No large vessel occlusion. The right V4 segment is mildly hypoplastic.    There is a bulbous appearance of the anterior communicating artery measuring approximately 6 mm in oblique transverse diameter on series 10, image 310 and 4 mm width on coronal MIP image 32. There is infundibular origin of the right posterior communicating artery.    IMPRESSION:    No intracranial large vessel arterial occlusion or high-grade stenosis given motion limitation of the study.    Questionable aneurysm versus bulbous confluence at the anterior communicatingartery. This may be monitored on follow-up CTA at 6 months, and outpatient referral to neurosurgery is also suggested to determine need for additional/repeat imaging.    -------------------------------    PROCEDURE: CTA neck with intravenous contrast.    INDICATION: Dizziness, unresolved.    TECHNIQUE: Multiple axial thin section were obtained through the neck following the intravenous bolus injection of Optiray-350 as above. MIP series are provided.  Patient unable to lie flat.    COMPARISON: None.    FINDINGS:    The aortic arch is normal size and shows patency, with normal 3 vessel configuration. Mild atherosclerotic plaque along the aortic arch.    The proximal left common carotid artery is patent but not well seen in the presence of image nor is present streak artifact from incoming venous contrast. Otherwise, both common carotid arteries are patent throughout their bilateral course in the neck. Both carotid bifurcations show minimal calcification. There is mild atherosclerotic plaque at the proximal right internal carotid artery, however neither internal carotid artery shows hemodynamically significant stenosis or occlusion, each widely patent up to the skull base without dissection injury.    Both cervical vertebral arteries are patent throughout, without hemodynamically significant stenosis or occlusion.    IMPRESSION:    No carotid or vertebral artery hemodynamically significant stenosis or occlusion in the neck.                Vital Signs Last 24 Hrs  T(C): 36.3 (29 Dec 2020 06:03), Max: 37.2 (28 Dec 2020 17:58)  T(F): 97.3 (29 Dec 2020 06:03), Max: 98.9 (28 Dec 2020 17:58)  HR: 54 (29 Dec 2020 08:00) (44 - 90)  BP: 156/71 (29 Dec 2020 08:00) (124/58 - 175/78)  BP(mean): 102 (29 Dec 2020 08:00) (85 - 112)  RR: 18 (29 Dec 2020 08:00) (14 - 38)  SpO2: 97% (29 Dec 2020 08:00) (94% - 98%)    REVIEW OF SYSTEMS:    CONSTITUTIONAL: No fever, weight loss, or fatigue  EYES: No eye pain, visual disturbances, or discharge  ENMT:  No difficulty hearing, tinnitus, vertigo; No sinus or throat pain  NECK: No pain or stiffness  BREASTS: No pain, masses, or nipple discharge  RESPIRATORY: No cough, wheezing, chills or hemoptysis; No shortness of breath  CARDIOVASCULAR: No chest pain, palpitations, dizziness, or leg swelling  GASTROINTESTINAL: No abdominal or epigastric pain. No nausea, vomiting, or hematemesis; No diarrhea or constipation. No melena or hematochezia.  GENITOURINARY: No dysuria, frequency, hematuria, or incontinence  NEUROLOGICAL: dizziness  SKIN: No itching, burning, rashes, or lesions   LYMPH NODES: No enlarged glands  ENDOCRINE: No heat or cold intolerance; No hair loss  MUSCULOSKELETAL: No joint pain or swelling; No muscle, back, or extremity pain  PSYCHIATRIC: No depression, anxiety, mood swings, or difficulty sleeping  HEME/LYMPH: No easy bruising, or bleeding gums  ALLERGY AND IMMUNOLOGIC: No hives or eczema  VASCULAR: no swelling, erythema,   : no dysuria, hematuria, frequency        Physical Exam: in MICU, WDWN 80 yo  gentleman lying in semi Figueroa's position, no acute complaints    Head: normocephalic, atraumatic    Eyes: PERRLA, EOMI, no nystagmus, sclera anicteric    ENT: nasal discharge, uvula midline, no oropharyngeal erythema/exudate    Neck: supple, negative JVD, negative carotid bruits, no thyromegaly    Chest: CTA bilaterally, neg wheeze/ rhonchi/ rales/ crackles/ egophany    Cardiovascular: regular rate and rhythm, neg murmurs/rubs/gallops    Abdomen: soft, non distended, non tender to palpation in all 4 quadrants, negative rebound/guarding, normal bowel sounds    Extremities: WWP, neg cyanosis/clubbing/edema, negative calf tenderness to palpation, negative Claudia's sign    Musculoskeletal:    Skin:      :     Neurologic Exam:    Alert and oriented to person, place, date/year, speech fluent w/ mild dysarthria, follows commands, recent and remote memory intact, repetition intact, comprehension intact,  attention/concentration intact, fund of knowledge appropriate    Cranial Nerves:     II:                       pupils equal, round and reactive to light, visual fields intact   III/ IV/VI:            extraocular movements intact, neg nystagmus, neg ptosis  V:                       facial sensation intact, V1-3 normal  VII:                     slight L flattening NLF, normal eye closure and smile  VIII:                    hearing intact to finger rub bilaterally  IX/ X:                 soft palate rise symmetrical  XI:                      head turning, shoulder shrug normal  XII:                     tongue midline    Motor Exam:    Right UE:    : 5/5                     wrist extensors/ flexors: 5/5                     biceps:   5/5                     triceps:  5/5                     deltoid:  5/5                     pronator drift: neg        Left UE:      :  5/5                    wrist extensors/ flexors:  5/5                    biceps:    5/5                    triceps:   5/5                    deltoid:  5/5                    pronator drift:  mild        Right LE:   dorsiflexors:  5/5                    plantar flexors:  5/5                    quadriceps:  5/5                    hamstrings:  5/5                    hip flexors:  5/5        Left LE:      dorsiflexors:  5/5                   plantar flexors: 5/5                   quadriceps:  5/5                   hamstrings:  5/5                   hip flexors:  5/5               Sensation: Intact to light touch x 4 extremities                     No neglect or extinction on double simultaneous testing.                       DTR:                        biceps/brachioradialis: equal bilaterally          R:       L:             patella/ankle: equal bilaterally          R:      L:             neg clonus          neg Babinski                        Finger to Nose:  dysmetria          R:  +       L:  +    Heel to shin: wnl bilaterally         Rapid Alternating movements:  dysdiadochokinesia          R:            L:  slight    Joint Position Sense: wnl bilaterally          Romberg:  not tested    Tandem Walking:  not tested    Gait:  not tested        PM&R Impression:    1) r/o acute stroke  2) s/p tPA  3) no focal UE/LE weakness    Plan:    1) Physical therapy focusing on therapeutic exercises, bed mobility/transfer out of bed evaluation, progressive ambulation with assistive devices prn.    2) Anticipated Disposition Plan/Recs:   pending functional progress                              
 **STROKE CODE CONSULT NOTE**    Last known well time/Time of onset of symptoms: 12:20 on 12/28/2020    HPI: 80 y/o male with PMH HTN (on Norvasc 10 mg) and HLD (on Lipitor 10 mg) who self-presented to the ED for acute onset of dizziness since 12:20 PM. Pt reports he was watching television when he suddenly felt dizzy, as if the room was spinning. Pt reports he tried to stand up and drink some water, but felt off-balance and like he was about to pass out. Pt states he was able to drink some water, but that did not help with his symptoms. Of note, patient reports experiencing similar dizziness 3 days ago, but states his symptoms self-resolved within a few hours. There is questionable history of vertigo - patient is unsure. NIHSS 4. In the ED, patient complained of persistent room-spinning sensation and was very anxious to lie flat due to fear of "passing out". After head and neck imaging, patient presented with tremors in both arms. Pt otherwise denies headache, blurry vision, chest pain, palpitations, syncope, extremity weakness, extremity numbness, nausea, vomiting, tinnitus.      T(C): 36.4 (12-28-20 @ 15:55), Max: 36.7 (12-28-20 @ 15:40)  HR: 64 (12-28-20 @ 17:00) (64 - 90)  BP: 155/74 (12-28-20 @ 17:00) (124/58 - 175/78)  RR: 18 (12-28-20 @ 17:00) (16 - 25)  SpO2: 98% (12-28-20 @ 17:00) (95% - 98%)    PAST MEDICAL & SURGICAL HISTORY:  HTN (hypertension)  HLD    ROS:   Constitutional: No fever, weight loss or fatigue  Eyes: No eye pain or discharge  ENMT:  No difficulty hearing, tinnitus, vertigo; No sinus or throat pain  Neck: No pain or stiffness  Respiratory: No cough, wheezing, chills or hemoptysis  Cardiovascular: No shortness of breath or leg swelling  Gastrointestinal: No diarrhea or constipation.   Genitourinary: No dysuria, frequency, hematuria or incontinence  Neurological: As per HPI  Skin: No itching, burning, rashes or lesions   Musculoskeletal: No joint pain or swelling; No muscle, back or extremity pain    Allergies  avocado (Anaphylaxis (Mild); Angioedema (Mod to Severe); Headache; Other)  penicillin (Rash)  Intolerances  statins (Muscle Pain)    Vital Signs Last 24 Hrs  T(C): 36.4 (28 Dec 2020 15:55), Max: 36.7 (28 Dec 2020 15:40)  T(F): 97.5 (28 Dec 2020 15:55), Max: 98 (28 Dec 2020 15:40)  HR: 64 (28 Dec 2020 17:00) (64 - 90)  BP: 155/74 (28 Dec 2020 17:00) (124/58 - 175/78)  BP(mean): 106 (28 Dec 2020 17:00) (99 - 112)  RR: 18 (28 Dec 2020 17:00) (16 - 25)  SpO2: 98% (28 Dec 2020 17:00) (95% - 98%)    Physical exam:  Constitutional: Patient with bilateral arm tremors, no acute distress    Neurologic:  -Mental status: Awake, alert, oriented to person, place, and time. Slight dysarthria. Intact naming, repetition, and comprehension. Recent and remote memory intact. Follows commands. Attention/concentration intact. Fund of knowledge appropriate.  -Cranial nerves:   II: Visual fields are full to confrontation  III, IV, VI: Extraocular movements are intact without nystagmus - patient reports lateral eye movement worsens dizziness. Pupils equally round and reactive to light  V:  Facial sensation V1-V3 equal and intact   VII: Face is symmetric with normal eye closure and smile  VIII: Gross hearing is intact  Motor: Normal bulk and tone. Left pronator drift. Strength 5/5 in bilateral lower extremities, 4/5 in bilateral upper extremities.   Sensation: Intact to light touch bilaterally. No neglect or extinction on double simultaneous testing.  Coordination: dysmetria on finger-to-nose bilaterally  Reflexes: Downgoing toes bilaterally   Gait: Narrow gait and steady    NIHSS: 4    Fingerstick Blood Glucose: CAPILLARY BLOOD GLUCOSE  109 (28 Dec 2020 17:37)    POCT Blood Glucose.: 109 mg/dL (28 Dec 2020 13:51)    LABS:                        14.3   7.14  )-----------( 158      ( 28 Dec 2020 14:41 )             41.1     139  |  104  |  25<H>  ----------------------------<  101<H>  3.8   |  23  |  1.05    Ca    9.3      28 Dec 2020 14:41    TPro  8.0  /  Alb  4.5  /  TBili  0.6  /  DBili  x   /  AST  19  /  ALT  17  /  AlkPhos  73  12-28    PT/INR - ( 28 Dec 2020 14:41 )   PT: 15.3 sec;   INR: 1.29       PTT - ( 28 Dec 2020 14:41 )  PTT:29.6 sec  CARDIAC MARKERS ( 28 Dec 2020 14:41 )  x     / <0.01 ng/mL / x     / x     / x        RADIOLOGY & ADDITIONAL STUDIES:  < from: CT Brain Stroke Protocol (12.28.20 @ 14:37) >  Motion limited exam.  No large/visible acute intracranial hemorrhage, mass effect or definite transcortical infarction. Consider repeat exam or MR if there is continued concern for recent cerebrovascular event.    < from: CT Angio Head w/ IV Cont (12.28.20 @ 14:38) >  No intracranial large vessel arterial occlusion or high-grade stenosis given motion limitation of the study.    < from: CT Angio Head w/ IV Cont (12.28.20 @ 14:38) >  No carotid or vertebral artery hemodynamically significant stenosis or occlusion in the neck.    < from: CT Perfusion w/ Maps w/ IV Cont (12.28.20 @ 14:38) >  IMPRESSION:  No reported perfusion deficit or Tmax>6 seconds given motion limitation.    -----------------------------------------------------------------------------------------------------------------  IV-tPA (Y/N):  Yes                     Bolus time: 14:56
DAVID ROBERTS  79y  Male    Patient is a 79y old  Male who presents with a chief complaint of r/o CVA, s/p tPA (30 Dec 2020 17:00)      HPI:  80 y/o male with PMH HTN (on Norvasc 10 mg) and HLD (on Lipitor 10 mg) who self-presented to the ED for acute onset of dizziness since 12:20 PM. Pt reports he was watching television when he suddenly felt dizzy, as if the room was spinning. Pt reports he tried to stand up and drink some water, but felt off-balance and like he was about to pass out. Pt states he was able to drink some water, but that did not help with his symptoms. Of note, patient reports experiencing similar dizziness 3 days ago, but states his symptoms self-resolved within a few hours. There is questionable history of vertigo - patient is unsure. NIHSS 4. In the ED, patient complained of persistent room-spinning sensation and was very anxious to lie flat due to fear of "passing out". After head and neck imaging, patient presented with tremors in both arms. Pt otherwise denies headache, blurry vision, chest pain, palpitations, syncope, extremity weakness, extremity numbness, nausea, vomiting, tinnitus.  Last known well time/Time of onset of symptoms: 12:20 on 12/28/2020    ED Course   VS: 97.7, 90, 158/78, 18, 97%  Labs: PT 15.3, INR 1.29,  BUN 25, Glu 101,   Imaging: CTH showing no acute ICH or transcortical infarction. CTP showing no perfusion deficit. CTA showing no LVO or stenosis.   Received: Despite negative imaging, tPA administered as 14:56 for focal neurologic deficits of bilateral dysmetria, slurred speech, and left pronator drift.   (29 Dec 2020 02:14)    Seen by me early this afternoon. Sitting in chair, feeling better. Daughter came at time of visit, states that patient's dizziness is a chronic issue, not new. Patient seems also to be anxious. Denies HA, mild dizziness/lightheadedness. No SOB or chest pain.       PAST MEDICAL & SURGICAL HISTORY:  HTN (hypertension)    No significant past surgical history        Home Medications:  Norvasc 10 mg oral tablet: 1 tab(s) orally once a day (28 Jan 2020 07:14)      79y    FAMILY HISTORY:  Mother had CVA at age 69    Marital Status:  (   )    (   ) Single    (   )    (  )   Lives with: (  ) alone  (  ) children   (  ) spouse   (  ) parents  ( X ) other- with friend  Recent Travel: No recent travel  Occupation:    Substance Use (street drugs): ( x ) never used  (  ) other:  Tobacco Usage:  ( x  ) never smoked   (   ) former smoker   (   ) current smoker  (     ) pack year  Alcohol Usage: Occasional alcohol use      MEDICATIONS  (STANDING):  amLODIPine   Tablet 10 milliGRAM(s) Oral every 24 hours  atorvastatin 20 milliGRAM(s) Oral at bedtime  multivitamin 1 Tablet(s) Oral daily    MEDICATIONS  (PRN):  meclizine 12.5 milliGRAM(s) Oral three times a day PRN Dizziness    REVIEW OF SYSTEMS:  As HPI otherwise reviewed and negative    Vital Signs Last 24 Hrs  T(C): 36.4 (30 Dec 2020 13:41), Max: 37.1 (30 Dec 2020 06:56)  T(F): 97.6 (30 Dec 2020 13:41), Max: 98.8 (30 Dec 2020 06:56)  HR: 64 (30 Dec 2020 20:15) (42 - 92)  BP: 164/79 (30 Dec 2020 20:15) (123/62 - 170/75)  BP(mean): 113 (30 Dec 2020 20:15) (87 - 113)  RR: 18 (30 Dec 2020 20:15) (16 - 19)  SpO2: 97% (30 Dec 2020 20:15) (94% - 97%)    PHYSICAL EXAM:  GENERAL: NAD, well-groomed, well-developed  HEAD:  Atraumatic, Normocephalic  EYES: EOMI, PERRLA, conjunctiva and sclera clear  ENMT: No tonsillar erythema, exudates, or enlargement; Moist mucous membranes, Good dentition, No lesions  NECK: Supple, No JVD, Normal thyroid  NERVOUS SYSTEM:  Alert & Oriented X3, Good concentration; moves all 4 extremities  CHEST/LUNG: Clear to percussion bilaterally; No rales, rhonchi, wheezing, or rubs  HEART: Regular rate and rhythm; No murmurs, rubs, or gallops  ABDOMEN: Soft, Nontender, Nondistended; Bowel sounds present  EXTREMITIES:  2+ Peripheral Pulses, No clubbing, cyanosis, or edema  LYMPH: No lymphadenopathy noted  SKIN: No rashes or lesions    Consultant(s) Notes Reviewed:  [x ] YES  [ ] NO  Care Discussed with Consultants/Other Providers [ x] YES  [ ] NO    LABS:                        15.5   8.67  )-----------( 159      ( 30 Dec 2020 18:43 )             43.9     12-30    136  |  102  |  23  ----------------------------<  139<H>  3.6   |  22  |  0.80    Ca    9.6      30 Dec 2020 18:43  Phos  3.6     12-30  Mg     2.3     12-30          CAPILLARY BLOOD GLUCOSE      POCT Blood Glucose.: 96 mg/dL (30 Dec 2020 14:48)        RADIOLOGY & ADDITIONAL TESTS:

## 2020-12-30 NOTE — PROGRESS NOTE ADULT - ASSESSMENT
78 y/o male with PMH HTN (on Norvasc 10 mg) and HLD (on Lipitor 10 mg) who self-presented to the ED for acute onset of dizziness since 12:20 PM on 12/28. NIHSS 4. CTH showing no acute ICH or transcortical infarction. CTP showing no perfusion deficit. CTA showing no LVO or stenosis. Despite negative imaging, tPA administered as 14:56 on 12/28 for focal neurologic deficits of bilateral dysmetria, slurred speech, and left pronator drift. Pt admitted to MICU for post-tpa monitoring. Pt is neurologically stable and to be stepped down to [facility] for further management

## 2020-12-30 NOTE — PROGRESS NOTE ADULT - SUBJECTIVE AND OBJECTIVE BOX
INTERVAL HPI/OVERNIGHT EVENTS:  Pt stepped down from MICU after 24hr post tPA monitoring in stable condition, CLAIRE o/n. Physical exam this AM unremarkable. Pt nervous about MRI, ordered for repeat CTH noncon instead, pt still pending TTE. Lipitor increased to 20 mg today and will monitor.    On further ROS, patient did not have complaint of: Headache, Blurred Vision, Cough, Dyspnea, Palpitations, Abdominal Pain, N/V, Weakness, Change in Sensation.     VITAL SIGNS:  T(F): 97.6 (12-30-20 @ 13:41)  HR: 90 (12-30-20 @ 11:56)  BP: 154/74 (12-30-20 @ 11:56)  RR: 18 (12-30-20 @ 11:56)  SpO2: 96% (12-30-20 @ 11:56)  Wt(kg): --    Input & Output:    12-29-20 @ 07:01  -  12-30-20 @ 07:00  --------------------------------------------------------  IN: 0 mL / OUT: 750 mL / NET: -750 mL    12-30-20 @ 07:01  -  12-30-20 @ 17:01  --------------------------------------------------------  IN: 0 mL / OUT: 300 mL / NET: -300 mL        PHYSICAL EXAM:  General: Comfortable, pleasant/anxious/agitated, Ill-appearing, well-nourished/frail/cachectic, comfortable / in distress  Neurological: AAOx3, no focal deficits  HEENT: NC/AT; EOMI, PERRL, clear conjunctiva, no nasal or oropharyngeal discharge or exudates, MMM  Neck: supple, no cervical or post-auricular lymphadenopathy  Cardiovascular: +S1/S2, no murmurs/rubs/gallops, RRR  Respiratory: CTA B/L, no diminished breath sounds, no wheezes/rales/rhonchi, no increased work of breathing or accessory muscle use  Gastrointestinal: soft, NT/ND; active BSx4 quadrants  Genitourinary: no suprapubic tenderness, no CVA tenderness  Extremities: WWP; no edema, clubbing or cyanosis  Vascular: 2+ radial, DP/PT pulses B/L  Skin: no rashes  Lines/Drains:     MEDICATIONS  (STANDING):  amLODIPine   Tablet 10 milliGRAM(s) Oral every 24 hours  atorvastatin 20 milliGRAM(s) Oral at bedtime  multivitamin 1 Tablet(s) Oral daily    MEDICATIONS  (PRN):      Allergies    avocado (Anaphylaxis (Mild); Angioedema (Mod to Severe); Headache; Other)  penicillin (Rash)    Intolerances    statins (Muscle Pain)      LABS:                        13.9   7.16  )-----------( 141      ( 29 Dec 2020 05:07 )             40.4     12-29    140  |  105  |  19  ----------------------------<  86  3.6   |  23  |  0.72    Ca    8.9      29 Dec 2020 05:07            RADIOLOGY & ADDITIONAL TESTS:   INTERVAL HPI/OVERNIGHT EVENTS:  Pt stepped down from MICU after 24hr post tPA monitoring in stable condition, CLAIRE o/n. Physical exam this AM unremarkable. Pt nervous about MRI, ordered for repeat CTH noncon instead, pt still pending TTE. Lipitor increased to 20 mg today and will monitor.    On further ROS, patient did not have complaint of: Headache, Blurred Vision, Cough, Dyspnea, Palpitations, Abdominal Pain, N/V, Weakness, Change in Sensation.     VITAL SIGNS:  T(F): 97.6 (12-30-20 @ 13:41)  HR: 90 (12-30-20 @ 11:56)  BP: 154/74 (12-30-20 @ 11:56)  RR: 18 (12-30-20 @ 11:56)  SpO2: 96% (12-30-20 @ 11:56)  Wt(kg): --    Input & Output:    12-29-20 @ 07:01  -  12-30-20 @ 07:00  --------------------------------------------------------  IN: 0 mL / OUT: 750 mL / NET: -750 mL    12-30-20 @ 07:01  -  12-30-20 @ 17:01  --------------------------------------------------------  IN: 0 mL / OUT: 300 mL / NET: -300 mL      PHYSICAL EXAM  General: WDWN. Sitting upright in bed in NAD  HEENT: NC/AT; PERRL, anicteric sclera; MMM, slight left facial droop  Neck: supple  Cardiovascular: +S1/S2; RRR  Respiratory: CTA B/L; no W/R/R  Gastrointestinal: soft, NT/ND; +BSx4  Extremities: WWP; no edema, clubbing or cyanosis  Vascular: 2+ radial, DP/PT pulses B/L    Neurologic:  Mental status: awake, alert, oriented to person, place, and time. Speech is fluent, able to name objects. Follows commands. Attention/concentration intact. No dysarthria, no aphasia.  Cranial nerves:   II: visual fields are full to confrontation. Pupils equally round and reactive to light,   III, IV, VI: EOMI without nystagmus  V:  V1-V3 sensation intact  VII: slight left facial asymmetry  VIII: gross hearing is intact  Motor: Normal bulk and tone, strength 5/5 in b/l UE and LE,  strength 5/5. No pronator drift  Sensation: intact to light touch. No neglect.  Coordination: Dysmetria on finger to nose bilaterally, right upper extremity more ataxic, but overall dysmetria improved from admission  Reflexes: downgoing toes bilaterally    MEDICATIONS  (STANDING):  amLODIPine   Tablet 10 milliGRAM(s) Oral every 24 hours  atorvastatin 20 milliGRAM(s) Oral at bedtime  multivitamin 1 Tablet(s) Oral daily    MEDICATIONS  (PRN):      Allergies    avocado (Anaphylaxis (Mild); Angioedema (Mod to Severe); Headache; Other)  penicillin (Rash)    Intolerances    statins (Muscle Pain)      LABS:                        13.9   7.16  )-----------( 141      ( 29 Dec 2020 05:07 )             40.4     12-29    140  |  105  |  19  ----------------------------<  86  3.6   |  23  |  0.72    Ca    8.9      29 Dec 2020 05:07            RADIOLOGY & ADDITIONAL TESTS:

## 2020-12-30 NOTE — PROGRESS NOTE ADULT - PROBLEM SELECTOR PLAN 1
Presented with dizziness and hand tremors b/l. NIHSS 4. CTH showing no acute ICH or transcortical infarction. CTP showing no perfusion deficit. CTA showing no LVO or stenosis. Despite negative imaging. S/p tPA. L sided facial droop resolved, dysmetria minimal today  -Repeat CT head with any acute change in mental status.   -c/w Neurochecks q6h  -Holding AC/DAPT i/s/o recent tPA protocol, however pt now >24 since CTH confirmation of no hemorrhage, repeat CTH 12/31  -Stroke education  -DVT ppx - SCDs (NO heparin SQ as post tPA protocol) will f/u with neuro  -dysphagia screen passed  -c/w Atorvastatin 80mg daily Presented with dizziness and hand tremors b/l. NIHSS 4. CTH showing no acute ICH or transcortical infarction. CTP showing no perfusion deficit. CTA showing no LVO or stenosis. Despite negative imaging. S/p tPA. Slight L facial asymmetry, dysmetria minimal today.  -Repeat CT head with any acute change in mental status.   -c/w Neurochecks q6h  -Holding AC/DAPT i/s/o recent tPA protocol, however pt now >24 since CTH confirmation of no hemorrhage, repeat CTH 12/31  -Stroke education  -DVT ppx - SCDs (NO heparin SQ as post tPA protocol) will f/u with neuro  -dysphagia screen passed  -c/w Atorvastatin 80mg daily Presented with dizziness and hand tremors b/l. NIHSS 4. CTH showing no acute ICH or transcortical infarction. CTP showing no perfusion deficit. CTA showing no LVO or stenosis. Despite negative imaging. S/p tPA. Slight L facial asymmetry, dysmetria minimal today.  -Repeat CT head with any acute change in mental status.   -c/w Neurochecks q6h  -Holding AC/DAPT i/s/o recent tPA protocol, however pt now >24 since CTH confirmation of no hemorrhage, repeat CTH 12/31  -Stroke education  -DVT ppx - SCDs (NO heparin SQ as post tPA protocol) will f/u with neuro  -dysphagia screen passed  -Atorvastatin increased to 20 mg today i/s/o previous myalgia rxn to crestor. Will continue to monitor pt.

## 2020-12-31 ENCOUNTER — TRANSCRIPTION ENCOUNTER (OUTPATIENT)
Age: 80
End: 2020-12-31

## 2020-12-31 VITALS — TEMPERATURE: 97 F

## 2020-12-31 LAB
ANION GAP SERPL CALC-SCNC: 10 MMOL/L — SIGNIFICANT CHANGE UP (ref 5–17)
BUN SERPL-MCNC: 22 MG/DL — SIGNIFICANT CHANGE UP (ref 7–23)
CALCIUM SERPL-MCNC: 9.1 MG/DL — SIGNIFICANT CHANGE UP (ref 8.4–10.5)
CHLORIDE SERPL-SCNC: 101 MMOL/L — SIGNIFICANT CHANGE UP (ref 96–108)
CO2 SERPL-SCNC: 25 MMOL/L — SIGNIFICANT CHANGE UP (ref 22–31)
CREAT SERPL-MCNC: 0.87 MG/DL — SIGNIFICANT CHANGE UP (ref 0.5–1.3)
GLUCOSE SERPL-MCNC: 93 MG/DL — SIGNIFICANT CHANGE UP (ref 70–99)
HCT VFR BLD CALC: 39.6 % — SIGNIFICANT CHANGE UP (ref 39–50)
HGB BLD-MCNC: 13.9 G/DL — SIGNIFICANT CHANGE UP (ref 13–17)
MAGNESIUM SERPL-MCNC: 2.2 MG/DL — SIGNIFICANT CHANGE UP (ref 1.6–2.6)
MCHC RBC-ENTMCNC: 30.6 PG — SIGNIFICANT CHANGE UP (ref 27–34)
MCHC RBC-ENTMCNC: 35.1 GM/DL — SIGNIFICANT CHANGE UP (ref 32–36)
MCV RBC AUTO: 87.2 FL — SIGNIFICANT CHANGE UP (ref 80–100)
NRBC # BLD: 0 /100 WBCS — SIGNIFICANT CHANGE UP (ref 0–0)
PHOSPHATE SERPL-MCNC: 3.9 MG/DL — SIGNIFICANT CHANGE UP (ref 2.5–4.5)
PLATELET # BLD AUTO: 150 K/UL — SIGNIFICANT CHANGE UP (ref 150–400)
POTASSIUM SERPL-MCNC: 3.6 MMOL/L — SIGNIFICANT CHANGE UP (ref 3.5–5.3)
POTASSIUM SERPL-SCNC: 3.6 MMOL/L — SIGNIFICANT CHANGE UP (ref 3.5–5.3)
RBC # BLD: 4.54 M/UL — SIGNIFICANT CHANGE UP (ref 4.2–5.8)
RBC # FLD: 12.6 % — SIGNIFICANT CHANGE UP (ref 10.3–14.5)
SODIUM SERPL-SCNC: 136 MMOL/L — SIGNIFICANT CHANGE UP (ref 135–145)
WBC # BLD: 7.9 K/UL — SIGNIFICANT CHANGE UP (ref 3.8–10.5)
WBC # FLD AUTO: 7.9 K/UL — SIGNIFICANT CHANGE UP (ref 3.8–10.5)

## 2020-12-31 PROCEDURE — 99238 HOSP IP/OBS DSCHRG MGMT 30/<: CPT

## 2020-12-31 PROCEDURE — 99233 SBSQ HOSP IP/OBS HIGH 50: CPT

## 2020-12-31 RX ORDER — POTASSIUM CHLORIDE 20 MEQ
20 PACKET (EA) ORAL ONCE
Refills: 0 | Status: COMPLETED | OUTPATIENT
Start: 2020-12-31 | End: 2020-12-31

## 2020-12-31 RX ORDER — ESCITALOPRAM OXALATE 10 MG/1
5 TABLET, FILM COATED ORAL DAILY
Refills: 0 | Status: DISCONTINUED | OUTPATIENT
Start: 2020-12-31 | End: 2020-12-31

## 2020-12-31 RX ORDER — ASPIRIN/CALCIUM CARB/MAGNESIUM 324 MG
1 TABLET ORAL
Qty: 30 | Refills: 1
Start: 2020-12-31 | End: 2021-02-28

## 2020-12-31 RX ORDER — ESCITALOPRAM OXALATE 10 MG/1
1 TABLET, FILM COATED ORAL
Qty: 30 | Refills: 1
Start: 2020-12-31 | End: 2021-02-28

## 2020-12-31 RX ORDER — MECLIZINE HCL 12.5 MG
1 TABLET ORAL
Qty: 60 | Refills: 1
Start: 2020-12-31 | End: 2021-02-28

## 2020-12-31 RX ORDER — CLOPIDOGREL BISULFATE 75 MG/1
75 TABLET, FILM COATED ORAL DAILY
Refills: 0 | Status: DISCONTINUED | OUTPATIENT
Start: 2020-12-31 | End: 2020-12-31

## 2020-12-31 RX ORDER — MECLIZINE HCL 12.5 MG
1 TABLET ORAL
Qty: 0 | Refills: 0 | DISCHARGE
Start: 2020-12-31

## 2020-12-31 RX ORDER — ATORVASTATIN CALCIUM 80 MG/1
1 TABLET, FILM COATED ORAL
Qty: 0 | Refills: 0 | DISCHARGE
Start: 2020-12-31

## 2020-12-31 RX ORDER — ESCITALOPRAM OXALATE 10 MG/1
1 TABLET, FILM COATED ORAL
Qty: 0 | Refills: 0 | DISCHARGE
Start: 2020-12-31

## 2020-12-31 RX ORDER — CLOPIDOGREL BISULFATE 75 MG/1
1 TABLET, FILM COATED ORAL
Qty: 30 | Refills: 1
Start: 2020-12-31 | End: 2021-02-28

## 2020-12-31 RX ORDER — ATORVASTATIN CALCIUM 80 MG/1
1 TABLET, FILM COATED ORAL
Qty: 30 | Refills: 1
Start: 2020-12-31 | End: 2021-02-28

## 2020-12-31 RX ORDER — ASPIRIN/CALCIUM CARB/MAGNESIUM 324 MG
81 TABLET ORAL DAILY
Refills: 0 | Status: DISCONTINUED | OUTPATIENT
Start: 2020-12-31 | End: 2020-12-31

## 2020-12-31 RX ORDER — MECLIZINE HCL 12.5 MG
1 TABLET ORAL
Qty: 90 | Refills: 1
Start: 2020-12-31 | End: 2021-02-28

## 2020-12-31 RX ADMIN — Medication 1 TABLET(S): at 12:24

## 2020-12-31 RX ADMIN — ESCITALOPRAM OXALATE 5 MILLIGRAM(S): 10 TABLET, FILM COATED ORAL at 13:09

## 2020-12-31 RX ADMIN — AMLODIPINE BESYLATE 10 MILLIGRAM(S): 2.5 TABLET ORAL at 07:50

## 2020-12-31 RX ADMIN — Medication 81 MILLIGRAM(S): at 13:09

## 2020-12-31 RX ADMIN — CLOPIDOGREL BISULFATE 75 MILLIGRAM(S): 75 TABLET, FILM COATED ORAL at 13:09

## 2020-12-31 NOTE — DISCHARGE NOTE PROVIDER - HOSPITAL COURSE
Patient is 78 y/o male with PMH HTN (on Norvasc 10 mg) and HLD (on Lipitor 10 mg) who self-presented to the ED for acute onset of dizziness since 12:20 PM on 12/28/2020,  tPA administered as 14:56 on 12/28 for focal neurologic deficits of bilateral dysmetria, slurred speech, and left pronator drift., pt admitted to MICU for post-tpa monitoring, now stable for discharge home      Problem List/Main Diagnoses:     1. R/O CVA (cerebral vascular accident): Pt presented with dizziness and hand tremors b/l since 12:20 pm 12/28. NIHSS 4. CTH showing no acute ICH or transcortical infarction. CTP showing no perfusion deficit. CTA showing no LVO or stenosis. Despite negative imaging, tPA administered at 14:56 on 12/28 for focal neurologic deficits of bilateral dysmetria, slurred speech, and left pronator drift. Pt admitted to MICU for post tPA monitoring. Maintained strict bed rest for 24 hours with HOB <30 degrees, and no antiplatelet, anticoagulation, and heparin sq until 24 hour CT head negative for bleed. Repeat CT head noncon 24 hours post-TPA to rule out bleed 12/29/2020 @ 14:56 shows no acute change, pt stepped down in acuity of care with neuro checks continuing Q6 and continued holding AC/DAPT i/s/o recent tPA protocol, pt provided stroke education and started on meclizine for persistent episodes of dizziness with mild dose of Atrovastatin at 10 mg given pt history of myalgias while taking rosuvastatin. 12/30 repeat CTH unchanged, increased Atorvastatin to 20 mg, started ASA and Plavix. TTE normal, pt stable for discharge with ASA, Plavix, Atorvastatin, Meclizine, and escitalopram.    2. HTN (hypertension): Patient with history of hypertension on Norvasc 10 at home. Tight BP control during CVA r/o and post tPA administration monitoring. Started Amlodipine 12/30, stable for discharge home on home Norvasc 10.     3. HLD (hyperlipidemia): Patient with history of hyperlipidemia on 10mg of Lipitor at home but given concern of CVA patient will require a higher dose. Pt explained previous myalgias on rosuvastatin, therfore only increased atorvastatin to 20 mg this admission and pt will continue at this dosage on discharge      Inpatient treatment course: Presented to the ED on 12/28 for acute onset of dizziness since 12:20 PM. NIHSS 4. CTH showing no acute ICH or transcortical infarction. CTP showing no perfusion deficit. CTA showing no LVO or stenosis. Despite negative imaging, tPA administered as 14:56 for focal neurologic deficits of bilateral dysmetria, slurred speech, and left pronator drift. Pt admitted to MICU for post-tpa monitoring. In MICU patient monitored with frequent neuro checks. Passed bedside dysphagia screen. Started on Lipitor 80 mg. Pt is neurologically stable, with improving bilateral upper extremity ataxia from admission. Repeat CTH stable 24 hr post tpa. Pt stepped down to [facility] for continued monitoring for additional 24 hrs, remained slightly dizzy with resolution of facial asymmetry and dysmetria. Pt again expressed anxiety around imaging studies and medication for dizziness. Stable for discharge home with meclizine BID PRN and lexapro for anxiety.       New medications: ASA 81mg, Plavix 75mg, Atorvastatin 20mg, Meclizine 12.5mg BID, Escitalopram 5mg daily     Labs to be followed outpatient: None    Exam to be followed outpatient: Dizziness with Dr Gordon Patient is 78 y/o male with PMH HTN (on Norvasc 10 mg) and HLD (on Lipitor 10 mg) who self-presented to the ED for acute onset of dizziness since 12:20 PM on 12/28/2020,  tPA administered as 14:56 on 12/28 for focal neurologic deficits of bilateral dysmetria, slurred speech, and left pronator drift, pt admitted to MICU for post-tpa monitoring, now stable for discharge home      Problem List/Main Diagnoses:     1. R/O CVA (cerebral vascular accident): Pt presented with dizziness and hand tremors b/l since 12:20 pm 12/28. NIHSS 4. CTH showing no acute ICH or transcortical infarction. CTP showing no perfusion deficit. CTA showing no LVO or stenosis. Despite negative imaging, tPA administered at 14:56 on 12/28 for focal neurologic deficits of bilateral dysmetria, slurred speech, and left pronator drift. Pt admitted to MICU for post tPA monitoring. Maintained strict bed rest for 24 hours with HOB <30 degrees, and no antiplatelet, anticoagulation, and heparin sq until 24 hour CT head negative for bleed. Repeat CT head noncon 24 hours post-TPA to rule out bleed 12/29/2020 @ 14:56 shows no acute change, pt stepped down in acuity of care with neuro checks continuing Q6 and continued holding AC/DAPT i/s/o recent tPA protocol, pt provided stroke education and started on meclizine for persistent episodes of dizziness with mild dose of Atrovastatin at 10 mg given pt history of myalgias while taking rosuvastatin. 12/30 repeat CTH unchanged, increased Atorvastatin to 20 mg, started ASA and Plavix. TTE normal, pt stable for discharge with ASA, Plavix, Atorvastatin, Meclizine, and escitalopram.    2. HTN (hypertension): Patient with history of hypertension on Norvasc 10 at home. Tight BP control during CVA r/o and post tPA administration monitoring. Started Amlodipine 12/30, stable for discharge home on home Norvasc 10.     3. HLD (hyperlipidemia): Patient with history of hyperlipidemia on 10mg of Lipitor at home but given concern of CVA patient will require a higher dose. Pt explained previous myalgias on rosuvastatin, therfore only increased atorvastatin to 20 mg this admission and pt will continue at this dosage on discharge      Inpatient treatment course: Presented to the ED on 12/28 for acute onset of dizziness since 12:20 PM. NIHSS 4. CTH showing no acute ICH or transcortical infarction. CTP showing no perfusion deficit. CTA showing no LVO or stenosis. Despite negative imaging, tPA administered as 14:56 for focal neurologic deficits of bilateral dysmetria, slurred speech, and left pronator drift. Pt admitted to MICU for post-tpa monitoring. In MICU patient monitored with frequent neuro checks. Passed bedside dysphagia screen. Started on Lipitor 80 mg. Pt is neurologically stable, with improving bilateral upper extremity ataxia from admission. Repeat CTH stable 24 hr post tpa. Pt stepped down to [facility] for continued monitoring for additional 24 hrs, remained slightly dizzy with resolution of facial asymmetry and dysmetria. Pt again expressed anxiety around imaging studies and medication for dizziness. Stable for discharge home with meclizine BID PRN and lexapro for anxiety.       New medications: ASA 81mg, Plavix 75mg, Atorvastatin 20mg, Meclizine 12.5mg BID, Escitalopram 5mg daily     Labs to be followed outpatient: None    Exam to be followed outpatient: Dizziness with Dr Gordon

## 2020-12-31 NOTE — DISCHARGE NOTE NURSING/CASE MANAGEMENT/SOCIAL WORK - PATIENT PORTAL LINK FT
You can access the FollowMyHealth Patient Portal offered by [facility] by registering at the following website: [email or web address]. By joining [employer]’s FollowMyHealth portal, you will also be able to view your health information using other applications (apps) compatible with our system.

## 2020-12-31 NOTE — DISCHARGE NOTE PROVIDER - NSDCCPCAREPLAN_GEN_ALL_CORE_FT
PRINCIPAL DISCHARGE DIAGNOSIS  Diagnosis: Stroke  Assessment and Plan of Treatment: You were admitted to the hospital out of concern for a possible stroke causing some neurological issues like slurred speech, difficulty reaching for things, and dificulty holding your arms out straight, all of which are indicators that you might have decreased bood flow to crucial areas of your brain determining fine motor skills and balance. Multiple imaging exams were done that did not definitively show a stroke and ruled out any bleed in your brain. To be safe, you were given an emergency medication to thin your blood just in case a clot was present in your brain, and then monitored for 48 hours with repeat imaging of your brain showing no stroke. Your fine motor skill issues resolved but you developed some dizziness. You have been prescribed medications for your likely stroke (Atorvastatin 20mg, Plavix 75mg, and Aspirin 81mg), for the dizziness (meclizine 12.5 mg twice a day IF NEEDED), and to help with the anxiety surrounding your medical issues (Escitalopram 5mg.)  Please follow up with Dr Gordon's office. They will call you to schedule a time for a telehealth appointment on Tuesday, 1/5/2021. Please take all of your medications as prescribed. If you again experience sudden onset slurred speech, tremors or shaking of your hands, or inability to move portions of your body, please ask for help to the nearest emergency room.      SECONDARY DISCHARGE DIAGNOSES  Diagnosis: HTN (hypertension)  Assessment and Plan of Treatment: You have a history of hypertension for which you take Norvasc 10 mg daily at home. This was held initially while we treated you with the blood thinner for potential stroke, but restarted once it was safe to begin taking it again. Please continue to take this medication daily once you are discharged.  If you have a sudden headache, feel very lightheaded, or have severe dizziness associated with nausea or vomiting, please ask for help to the nearest emergency room.

## 2020-12-31 NOTE — PROGRESS NOTE ADULT - ASSESSMENT
79 year old male with,    # Dizziness: seems to be a chronic problem. Possible CVA, s/p tPA 12/28. Repeat HCT done is negative for acute intracranial hemorrhage or infarct. MADHURI shows EF of 63%, mild LVH, grade I diastolic dysfunction. No e/o R to L shunt. Trial of meclizine for concurrent likely BPPV. Brain MRI to be done as outpatient. C/w statin. PT/OT evals --> no needs. DVT PPx with SCD's for now.    # HTN: SBP goal per Stroke <180. Resume norvasc upon discharge.    # Hyperlipidemia: c/w statin.    # Thrombocytopenia: resolved.    # Pre-diabetes: with A1C of 5.7. Monitor BS for now.    # Anxiety: started on lexapro 5mg daily.    D/w Stroke Team, stable for discharge home today.  D/w friend at bedside.  PMD and Neurology follow up as outpatient.

## 2020-12-31 NOTE — PROGRESS NOTE ADULT - SUBJECTIVE AND OBJECTIVE BOX
Patient is a 80y old  Male who presents with a chief complaint of r/o CVA, s/p tPA (31 Dec 2020 12:05)      INTERVAL HPI/OVERNIGHT EVENTS:  Feeling better, eager to going home today. Remains anxious-started on lexapro. Dizziness is improving. No new neurologic deficits. Friend at bedside.    Review of Systems: 12 point review of systems otherwise negative    MEDICATIONS  (STANDING):  amLODIPine   Tablet 10 milliGRAM(s) Oral every 24 hours  aspirin  chewable 81 milliGRAM(s) Oral daily  atorvastatin 20 milliGRAM(s) Oral at bedtime  clopidogrel Tablet 75 milliGRAM(s) Oral daily  escitalopram 5 milliGRAM(s) Oral daily  multivitamin 1 Tablet(s) Oral daily    MEDICATIONS  (PRN):  meclizine 12.5 milliGRAM(s) Oral three times a day PRN Dizziness      Allergies    avocado (Anaphylaxis (Mild); Angioedema (Mod to Severe); Headache; Other)  penicillin (Rash)    Intolerances    statins (Muscle Pain)        Vital Signs Last 24 Hrs  T(C): 36.3 (31 Dec 2020 13:24), Max: 36.8 (30 Dec 2020 22:07)  T(F): 97.3 (31 Dec 2020 13:24), Max: 98.2 (30 Dec 2020 22:07)  HR: 82 (31 Dec 2020 12:36) (50 - 82)  BP: 152/72 (31 Dec 2020 12:36) (117/58 - 164/79)  BP(mean): 103 (31 Dec 2020 12:36) (82 - 113)  RR: 18 (31 Dec 2020 12:36) (16 - 18)  SpO2: 96% (31 Dec 2020 12:36) (94% - 97%)  CAPILLARY BLOOD GLUCOSE          12-30 @ 07:01  -  12-31 @ 07:00  --------------------------------------------------------  IN: 200 mL / OUT: 800 mL / NET: -600 mL        Physical Exam:    Daily     Daily   General:  Well appearing, NAD, not cachetic  HEENT:  Nonicteric, PERRLA  CV:  RRR, no murmur, no JVD  Lungs:  CTA B/L, no wheezes, rales, rhonchi  Abdomen:  Soft, non-tender, no distended, positive BS  Extremities:  2+ pulses, no c/c, no edema  Skin:  Warm and dry, no rashes  :  No tiwari  Neuro:  AAOx3, non-focal  No Restraints    LABS:                        13.9   7.90  )-----------( 150      ( 31 Dec 2020 06:49 )             39.6     12-31    136  |  101  |  22  ----------------------------<  93  3.6   |  25  |  0.87    Ca    9.1      31 Dec 2020 06:49  Phos  3.9     12-31  Mg     2.2     12-31              RADIOLOGY & ADDITIONAL TESTS:

## 2020-12-31 NOTE — DISCHARGE NOTE PROVIDER - NSDCFUADDAPPT_GEN_ALL_CORE_FT
Dr Gordon's office will call to schedule your follow up appointment for next Tuesday, which will be by telehealth.

## 2020-12-31 NOTE — DISCHARGE NOTE PROVIDER - CARE PROVIDER_API CALL
Avelino Gordon  NEUROLOGY  [address] [place] [address]  Phone: [phone]  Fax: [phone]  Scheduled Appointment: 01/05/2021

## 2021-01-04 DIAGNOSIS — D69.6 THROMBOCYTOPENIA, UNSPECIFIED: ICD-10-CM

## 2021-01-04 DIAGNOSIS — R29.810 FACIAL WEAKNESS: ICD-10-CM

## 2021-01-04 DIAGNOSIS — I10 ESSENTIAL (PRIMARY) HYPERTENSION: ICD-10-CM

## 2021-01-04 DIAGNOSIS — R29.704 NIHSS SCORE 4: ICD-10-CM

## 2021-01-04 DIAGNOSIS — R27.0 ATAXIA, UNSPECIFIED: ICD-10-CM

## 2021-01-04 DIAGNOSIS — I63.9 CEREBRAL INFARCTION, UNSPECIFIED: ICD-10-CM

## 2021-01-04 DIAGNOSIS — Z88.0 ALLERGY STATUS TO PENICILLIN: ICD-10-CM

## 2021-01-04 DIAGNOSIS — H81.10 BENIGN PAROXYSMAL VERTIGO, UNSPECIFIED EAR: ICD-10-CM

## 2021-01-04 DIAGNOSIS — F41.9 ANXIETY DISORDER, UNSPECIFIED: ICD-10-CM

## 2021-01-04 DIAGNOSIS — E78.5 HYPERLIPIDEMIA, UNSPECIFIED: ICD-10-CM

## 2021-01-04 DIAGNOSIS — R73.03 PREDIABETES: ICD-10-CM

## 2021-01-07 ENCOUNTER — EMERGENCY (EMERGENCY)
Facility: HOSPITAL | Age: 81
LOS: 1 days | Discharge: HOME | End: 2021-01-07
Attending: EMERGENCY MEDICINE | Admitting: EMERGENCY MEDICINE
Payer: MEDICARE

## 2021-01-07 VITALS
RESPIRATION RATE: 18 BRPM | HEART RATE: 67 BPM | SYSTOLIC BLOOD PRESSURE: 128 MMHG | DIASTOLIC BLOOD PRESSURE: 70 MMHG | OXYGEN SATURATION: 98 % | TEMPERATURE: 99 F

## 2021-01-07 VITALS
TEMPERATURE: 98 F | OXYGEN SATURATION: 98 % | RESPIRATION RATE: 16 BRPM | SYSTOLIC BLOOD PRESSURE: 137 MMHG | HEIGHT: 66 IN | DIASTOLIC BLOOD PRESSURE: 87 MMHG | HEART RATE: 61 BPM | WEIGHT: 164.91 LBS

## 2021-01-07 DIAGNOSIS — R04.0 EPISTAXIS: ICD-10-CM

## 2021-01-07 DIAGNOSIS — Z79.899 OTHER LONG TERM (CURRENT) DRUG THERAPY: ICD-10-CM

## 2021-01-07 DIAGNOSIS — Z88.0 ALLERGY STATUS TO PENICILLIN: ICD-10-CM

## 2021-01-07 DIAGNOSIS — Z20.822 CONTACT WITH AND (SUSPECTED) EXPOSURE TO COVID-19: ICD-10-CM

## 2021-01-07 DIAGNOSIS — R26.2 DIFFICULTY IN WALKING, NOT ELSEWHERE CLASSIFIED: ICD-10-CM

## 2021-01-07 DIAGNOSIS — Z91.018 ALLERGY TO OTHER FOODS: ICD-10-CM

## 2021-01-07 DIAGNOSIS — I10 ESSENTIAL (PRIMARY) HYPERTENSION: ICD-10-CM

## 2021-01-07 LAB
ALBUMIN SERPL ELPH-MCNC: 4 G/DL — SIGNIFICANT CHANGE UP (ref 3.3–5)
ALP SERPL-CCNC: 63 U/L — SIGNIFICANT CHANGE UP (ref 40–120)
ALT FLD-CCNC: 14 U/L — SIGNIFICANT CHANGE UP (ref 10–45)
ANION GAP SERPL CALC-SCNC: 12 MMOL/L — SIGNIFICANT CHANGE UP (ref 5–17)
APTT BLD: 29.4 SEC — SIGNIFICANT CHANGE UP (ref 27.5–35.5)
AST SERPL-CCNC: 15 U/L — SIGNIFICANT CHANGE UP (ref 10–40)
BASOPHILS # BLD AUTO: 0.07 K/UL — SIGNIFICANT CHANGE UP (ref 0–0.2)
BASOPHILS NFR BLD AUTO: 0.8 % — SIGNIFICANT CHANGE UP (ref 0–2)
BILIRUB SERPL-MCNC: 0.7 MG/DL — SIGNIFICANT CHANGE UP (ref 0.2–1.2)
BUN SERPL-MCNC: 25 MG/DL — HIGH (ref 7–23)
CALCIUM SERPL-MCNC: 9.1 MG/DL — SIGNIFICANT CHANGE UP (ref 8.4–10.5)
CHLORIDE SERPL-SCNC: 104 MMOL/L — SIGNIFICANT CHANGE UP (ref 96–108)
CO2 SERPL-SCNC: 24 MMOL/L — SIGNIFICANT CHANGE UP (ref 22–31)
CREAT SERPL-MCNC: 0.78 MG/DL — SIGNIFICANT CHANGE UP (ref 0.5–1.3)
EOSINOPHIL # BLD AUTO: 0.27 K/UL — SIGNIFICANT CHANGE UP (ref 0–0.5)
EOSINOPHIL NFR BLD AUTO: 3 % — SIGNIFICANT CHANGE UP (ref 0–6)
GLUCOSE SERPL-MCNC: 99 MG/DL — SIGNIFICANT CHANGE UP (ref 70–99)
HCT VFR BLD CALC: 40 % — SIGNIFICANT CHANGE UP (ref 39–50)
HGB BLD-MCNC: 13.8 G/DL — SIGNIFICANT CHANGE UP (ref 13–17)
IMM GRANULOCYTES NFR BLD AUTO: 0.2 % — SIGNIFICANT CHANGE UP (ref 0–1.5)
INR BLD: 1.38 — HIGH (ref 0.88–1.16)
LYMPHOCYTES # BLD AUTO: 1.82 K/UL — SIGNIFICANT CHANGE UP (ref 1–3.3)
LYMPHOCYTES # BLD AUTO: 20.2 % — SIGNIFICANT CHANGE UP (ref 13–44)
MCHC RBC-ENTMCNC: 30.4 PG — SIGNIFICANT CHANGE UP (ref 27–34)
MCHC RBC-ENTMCNC: 34.5 GM/DL — SIGNIFICANT CHANGE UP (ref 32–36)
MCV RBC AUTO: 88.1 FL — SIGNIFICANT CHANGE UP (ref 80–100)
MONOCYTES # BLD AUTO: 0.74 K/UL — SIGNIFICANT CHANGE UP (ref 0–0.9)
MONOCYTES NFR BLD AUTO: 8.2 % — SIGNIFICANT CHANGE UP (ref 2–14)
NEUTROPHILS # BLD AUTO: 6.09 K/UL — SIGNIFICANT CHANGE UP (ref 1.8–7.4)
NEUTROPHILS NFR BLD AUTO: 67.6 % — SIGNIFICANT CHANGE UP (ref 43–77)
NRBC # BLD: 0 /100 WBCS — SIGNIFICANT CHANGE UP (ref 0–0)
PLATELET # BLD AUTO: 133 K/UL — LOW (ref 150–400)
POTASSIUM SERPL-MCNC: 3.5 MMOL/L — SIGNIFICANT CHANGE UP (ref 3.5–5.3)
POTASSIUM SERPL-SCNC: 3.5 MMOL/L — SIGNIFICANT CHANGE UP (ref 3.5–5.3)
PROT SERPL-MCNC: 7.5 G/DL — SIGNIFICANT CHANGE UP (ref 6–8.3)
PROTHROM AB SERPL-ACNC: 16.3 SEC — HIGH (ref 10.6–13.6)
RBC # BLD: 4.54 M/UL — SIGNIFICANT CHANGE UP (ref 4.2–5.8)
RBC # FLD: 12.6 % — SIGNIFICANT CHANGE UP (ref 10.3–14.5)
SARS-COV-2 RNA SPEC QL NAA+PROBE: SIGNIFICANT CHANGE UP
SODIUM SERPL-SCNC: 140 MMOL/L — SIGNIFICANT CHANGE UP (ref 135–145)
WBC # BLD: 9.01 K/UL — SIGNIFICANT CHANGE UP (ref 3.8–10.5)
WBC # FLD AUTO: 9.01 K/UL — SIGNIFICANT CHANGE UP (ref 3.8–10.5)

## 2021-01-07 PROCEDURE — 97161 PT EVAL LOW COMPLEX 20 MIN: CPT

## 2021-01-07 PROCEDURE — 99284 EMERGENCY DEPT VISIT MOD MDM: CPT

## 2021-01-07 PROCEDURE — 85730 THROMBOPLASTIN TIME PARTIAL: CPT

## 2021-01-07 PROCEDURE — 80053 COMPREHEN METABOLIC PANEL: CPT

## 2021-01-07 PROCEDURE — 85610 PROTHROMBIN TIME: CPT

## 2021-01-07 PROCEDURE — 36415 COLL VENOUS BLD VENIPUNCTURE: CPT

## 2021-01-07 PROCEDURE — U0005: CPT

## 2021-01-07 PROCEDURE — U0003: CPT

## 2021-01-07 PROCEDURE — 85025 COMPLETE CBC W/AUTO DIFF WBC: CPT

## 2021-01-07 RX ORDER — AMLODIPINE BESYLATE 2.5 MG/1
10 TABLET ORAL ONCE
Refills: 0 | Status: COMPLETED | OUTPATIENT
Start: 2021-01-07 | End: 2021-01-07

## 2021-01-07 RX ORDER — TRANEXAMIC ACID 100 MG/ML
5 INJECTION, SOLUTION INTRAVENOUS ONCE
Refills: 0 | Status: COMPLETED | OUTPATIENT
Start: 2021-01-07 | End: 2021-01-07

## 2021-01-07 RX ADMIN — TRANEXAMIC ACID 5 MILLILITER(S): 100 INJECTION, SOLUTION INTRAVENOUS at 05:30

## 2021-01-07 RX ADMIN — AMLODIPINE BESYLATE 10 MILLIGRAM(S): 2.5 TABLET ORAL at 07:04

## 2021-01-07 NOTE — PHYSICAL THERAPY INITIAL EVALUATION ADULT - ADDITIONAL COMMENTS
pt reports typically independent with all mobility and ADLs, however, has had increasing difficulty walking the past week or so with dizziness during mobility.

## 2021-01-07 NOTE — PHYSICAL THERAPY INITIAL EVALUATION ADULT - IMPAIRMENTS CONTRIBUTING IMPAIRED BED MOBILITY, REHAB EVAL
ataxic/impaired balance/impaired coordination/impaired motor control/impaired postural control/decreased strength

## 2021-01-07 NOTE — ED PROVIDER NOTE - PHYSICAL EXAMINATION
CONSTITUTIONAL: WD,WN. NAD.    SKIN: Normal color and turgor. No rash.    HEAD: NC/AT.  EYES: Conjunctiva clear. EOMI. PERRL.    ENT: Blood clots in both nostrils (unable to visualize bleeding site after nostrils cleared of blood), slow ooze of blood.  Blood clot dangling in retropharynx.    RESPIRATORY:  Breathing non-labored. No retractions or accessory muscle use.  Lungs CTA bilat.  CARDIOVASCULAR:  RRR, S1S2. No M/R/G.      GI:  Abdomen soft, nontender.    MSK: Neck supple with painless ROM.  No lower extremity edema or calf tenderness.  No joint swelling or ROM limitation.  NEURO: Alert and oriented; CN II-XII grossly intact. Speech clear. 5/5 strength in all extremities.  Good balance. Steady gait. CONSTITUTIONAL: WD,WN. NAD.    SKIN: Normal color and turgor. No rash.    HEAD: NC/AT.  EYES: Conjunctiva clear. EOMI. PERRL.    ENT: Blood clots in both nostrils (unable to visualize bleeding site after nostrils cleared of blood), slow ooze of blood.  Blood clot dangling in retropharynx.    RESPIRATORY:  Breathing non-labored. No retractions or accessory muscle use.  Lungs CTA bilat.  CARDIOVASCULAR:  RRR, S1S2. No M/R/G.      GI:  Abdomen soft, nontender.    MSK: Neck supple with painless ROM.  No lower extremity edema or calf tenderness.  No joint swelling or ROM limitation.  NEURO: Alert and oriented; CN II-XII grossly intact. Speech clear. 5/5 strength in all extremities.  Balance fair, requires assistance standing and walking.  F-N intact.

## 2021-01-07 NOTE — ED PROVIDER NOTE - ATTENDING CONTRIBUTION TO CARE
80M hx htn, high chol, s/p recent CVA (received tPA), discharged on plavix c/o nose bleed.  states woke up with it, bleeding from both nostrils.  no trauma.   gen- nad  heent- ncat, clear conj, blood to both nares  ext -wwp  neuro -aox3,  florez  bleeding improved with afrin spray, will try TXA gauze and monitor

## 2021-01-07 NOTE — ED PROVIDER NOTE - PATIENT PORTAL LINK FT
You can access the FollowMyHealth Patient Portal offered by [facility] by registering at the following website: [email or web address]. By joining [employer]’s FollowMyHealth portal, you will also be able to view your health information using other applications (apps) compatible with our system.

## 2021-01-07 NOTE — ED PROVIDER NOTE - NS ED ROS FT
CONSTITUTIONAL: No fever, chills, or weakness  NEURO: Persistent dizziness since recent stroke. No headache, no syncope; No focal weakness/tingling/numbness  EYES: No visual changes  ENT: HPI  PULM: No cough or dyspnea  CV: No chest pain or palpitations  GI: No abdominal pain, vomiting, or diarrhea  : No dysuria, hematuria, frequency  MSK: No neck pain or back pain, no joint pain  SKIN: no rash or unusual bruising

## 2021-01-07 NOTE — ED ADULT NURSE REASSESSMENT NOTE - NS ED NURSE REASSESS COMMENT FT1
no further bleeding from nose.  upon attempt to discharge pt he claims to be unable to ambulate and is too dizzy.  old record demonstrates pt was evaluated by PT and cleared for discharge by them and was given meclizine for his chronic dizziness by discharging team.  pt will wait for PT eval again and dispo based on their recs.

## 2021-01-07 NOTE — PHYSICAL THERAPY INITIAL EVALUATION ADULT - TRANSFER SAFETY CONCERNS NOTED: SIT/STAND, REHAB EVAL
decreased balance during turns/decreased safety awareness/losing balance/decreased sequencing ability/decreased step length/decreased weight-shifting ability

## 2021-01-07 NOTE — PHYSICAL THERAPY INITIAL EVALUATION ADULT - GENERAL OBSERVATIONS, REHAB EVAL
Pt received semi-supine no acute distress, agreeable to PT Eval. Left as found +call bell, RN aware. FIM 1

## 2021-01-07 NOTE — ED ADULT NURSE NOTE - OBJECTIVE STATEMENT
pt discharged yesterday after a CVA and put on plavix and bilateral nostrils began bleeding 1 hours ago.  no pain or trauma. pt discharged 12/31 after a CVA and put on plavix and bilateral nostrils began bleeding 1 hours ago.  no pain or trauma.

## 2021-01-07 NOTE — ED PROVIDER NOTE - PROGRESS NOTE DETAILS
Hemostasis maintained.  Will sign out to day team pending PT consult and CM evaluation. signed out to Alex pending PT evaluation and case management - no further bleeding noted during observation as per CM, dc to [facility]

## 2021-01-07 NOTE — PHYSICAL THERAPY INITIAL EVALUATION ADULT - IMPAIRMENTS FOUND, PT EVAL
aerobic capacity/endurance/fine motor/gait, locomotion, and balance/gross motor/muscle strength/poor safety awareness/posture

## 2021-01-07 NOTE — PHYSICAL THERAPY INITIAL EVALUATION ADULT - MODALITIES TREATMENT COMMENTS
noted to have mild-moderate b/l UE/LE tremors, worsening with mobility. visual tracking smooth pursuit grossly intact (noted to have right eye twitching), pt c/o tingling/ringing in b/l ears

## 2021-01-07 NOTE — PHYSICAL THERAPY INITIAL EVALUATION ADULT - PLANNED THERAPY INTERVENTIONS, PT EVAL
balance training/bed mobility training/gait training/motor coordination training/postural re-education/strengthening/transfer training

## 2021-01-07 NOTE — PHYSICAL THERAPY INITIAL EVALUATION ADULT - PERTINENT HX OF CURRENT PROBLEM, REHAB EVAL
79 yo m with Hx HTN, HLD, on aspirin & plavix s/p CVA (discharged 1 week ago) woke up apx 1 hour ago from sleep bleeding from both nostrils.

## 2021-01-07 NOTE — ED PROVIDER NOTE - CLINICAL SUMMARY MEDICAL DECISION MAKING FREE TEXT BOX
Epistaxis, nearly completely stopped by the time patient was seen in the ED.  No breathing problems.  Nostrils cleared, and oxymetazoline and topical TXA applied, maintained good hemostasis. Epistaxis, nearly completely stopped by the time patient was seen in the ED.  No breathing problems.  Nostrils cleared, and oxymetazoline and topical TXA applied, maintained good hemostasis.  Patient insists that he cannot care for himself at home; will have PT evaluate.

## 2021-01-07 NOTE — ED ADULT NURSE NOTE - NSIMPLEMENTINTERV_GEN_ALL_ED
Implemented All Universal Safety Interventions:  [place] to call system. Call bell, personal items and telephone within reach. Instruct patient to call for assistance. Room bathroom lighting operational. Non-slip footwear when patient is off stretcher. Physically safe environment: no spills, clutter or unnecessary equipment. Stretcher in lowest position, wheels locked, appropriate side rails in place.

## 2021-01-07 NOTE — PHYSICAL THERAPY INITIAL EVALUATION ADULT - GAIT DEVIATIONS NOTED, PT EVAL
decreased cash/increased time in double stance/decreased step length/decreased stride length/decreased weight-shifting ability

## 2021-01-07 NOTE — ED PROVIDER NOTE - OBJECTIVE STATEMENT
81 yo m with Hx HTN, HLD, on aspirin & plavix s/p CVA (discharged 1 week ago) woke up apx 1 hour ago from sleep bleeding from both nostrils.  Patient pinching nostrils and now has slow ooze of blood.  Blood in back of throat.  No difficulty breathing. 79 yo m with Hx HTN, HLD, on aspirin & plavix s/p CVA (discharged 1 week ago) woke up apx 1 hour ago from sleep bleeding from both nostrils.  Patient pinching nostrils and now has slow ooze of blood.  Blood in back of throat.  No difficulty breathing.  Reports having a problem with nosebleeds his entire life.  He also states he has been unable to walk due to dizziness ever since being discharged from the hospital last week and that meclizine has not helped. Says the person who helps him is an elderly female and he thinks he needs either a walker or to be admitted to the hospital.

## 2021-01-13 ENCOUNTER — NON-APPOINTMENT (OUTPATIENT)
Age: 81
End: 2021-01-13

## 2021-01-21 PROCEDURE — 80061 LIPID PANEL: CPT

## 2021-01-21 PROCEDURE — 99291 CRITICAL CARE FIRST HOUR: CPT | Mod: 25

## 2021-01-21 PROCEDURE — 82962 GLUCOSE BLOOD TEST: CPT

## 2021-01-21 PROCEDURE — 84100 ASSAY OF PHOSPHORUS: CPT

## 2021-01-21 PROCEDURE — 93306 TTE W/DOPPLER COMPLETE: CPT

## 2021-01-21 PROCEDURE — 97162 PT EVAL MOD COMPLEX 30 MIN: CPT

## 2021-01-21 PROCEDURE — 85025 COMPLETE CBC W/AUTO DIFF WBC: CPT

## 2021-01-21 PROCEDURE — 85730 THROMBOPLASTIN TIME PARTIAL: CPT

## 2021-01-21 PROCEDURE — 85027 COMPLETE CBC AUTOMATED: CPT

## 2021-01-21 PROCEDURE — 83735 ASSAY OF MAGNESIUM: CPT

## 2021-01-21 PROCEDURE — 0042T: CPT

## 2021-01-21 PROCEDURE — 80048 BASIC METABOLIC PNL TOTAL CA: CPT

## 2021-01-21 PROCEDURE — 97161 PT EVAL LOW COMPLEX 20 MIN: CPT

## 2021-01-21 PROCEDURE — 96374 THER/PROPH/DIAG INJ IV PUSH: CPT | Mod: XU

## 2021-01-21 PROCEDURE — 85610 PROTHROMBIN TIME: CPT

## 2021-01-21 PROCEDURE — 80053 COMPREHEN METABOLIC PANEL: CPT

## 2021-01-21 PROCEDURE — 70496 CT ANGIOGRAPHY HEAD: CPT

## 2021-01-21 PROCEDURE — 70498 CT ANGIOGRAPHY NECK: CPT

## 2021-01-21 PROCEDURE — 36415 COLL VENOUS BLD VENIPUNCTURE: CPT

## 2021-01-21 PROCEDURE — 83036 HEMOGLOBIN GLYCOSYLATED A1C: CPT

## 2021-01-21 PROCEDURE — 84484 ASSAY OF TROPONIN QUANT: CPT

## 2021-01-21 PROCEDURE — 84443 ASSAY THYROID STIM HORMONE: CPT

## 2021-01-21 PROCEDURE — 97116 GAIT TRAINING THERAPY: CPT

## 2021-01-21 PROCEDURE — 70450 CT HEAD/BRAIN W/O DYE: CPT

## 2021-01-21 PROCEDURE — U0003: CPT

## 2021-03-19 NOTE — ED PROVIDER NOTE - NS ED ATTENDING STATEMENT MOD
Vaccines entered in Epic.    Moderna # 1- 2-  Moderna # 2- 3-   I have personally performed a face to face diagnostic evaluation on this patient. I have reviewed the ACP note and agree with the history, exam and plan of care, except as noted.

## 2021-11-24 NOTE — ED PROVIDER NOTE - CONSTITUTIONAL NEGATIVE STATEMENT, MLM
no fever and no chills. Eye Protection Verbiage: Before proceeding with the stage, a plastic scleral shield was inserted. The globe was anesthetized with a few drops of 1% lidocaine with 1:100,000 epinephrine. Then, an appropriate sized scleral shield was chosen and coated with lacrilube ointment. The shield was gently inserted and left in place for the duration of each stage. After the stage was completed, the shield was gently removed.

## 2022-01-11 NOTE — DISCHARGE NOTE PROVIDER - NSDCMRMEDTOKEN_GEN_ALL_CORE_FT
Pts bottle of Adderall XR taken to Pharmacy and handed to [facility]. Norvasc 10 mg oral tablet: 1 tab(s) orally once a day  oseltamivir 75 mg oral capsule: 1 cap(s) orally once a day  Pepcid 20 mg oral tablet: 1 tab(s) orally 2 times a day  Zofran ODT 4 mg oral tablet, disintegratin tab(s) orally every 8 hours   atorvastatin 20 mg oral tablet: 1 tab(s) orally once a day (at bedtime)  escitalopram 5 mg oral tablet: 1 tab(s) orally once a day  meclizine 12.5 mg oral tablet: 1 tab(s) orally 3 times a day, As needed, Dizziness  Multiple Vitamins oral tablet: 1 tab(s) orally once a day  Norvasc 10 mg oral tablet: 1 tab(s) orally once a day   aspirin 81 mg oral tablet, chewable: 1 tab(s) orally once a day  atorvastatin 20 mg oral tablet: 1 tab(s) orally once a day (at bedtime)  clopidogrel 75 mg oral tablet: 1 tab(s) orally once a day  escitalopram 5 mg oral tablet: 1 tab(s) orally once a day  meclizine 12.5 mg oral tablet: 1 tab(s) orally 3 times a day, As needed, Dizziness  Multiple Vitamins oral tablet: 1 tab(s) orally once a day  Norvasc 10 mg oral tablet: 1 tab(s) orally once a day   aspirin 81 mg oral tablet, chewable: 1 tab(s) orally once a day  atorvastatin 20 mg oral tablet: 1 tab(s) orally once a day (at bedtime)  clopidogrel 75 mg oral tablet: 1 tab(s) orally once a day  escitalopram 5 mg oral tablet: 1 tab(s) orally once a day  meclizine 12.5 mg oral tablet: 1 tab(s) orally 2 times a day, As Needed -Dizziness   Multiple Vitamins oral tablet: 1 tab(s) orally once a day  Norvasc 10 mg oral tablet: 1 tab(s) orally once a day

## 2023-03-30 NOTE — PHYSICAL THERAPY INITIAL EVALUATION ADULT - PATIENT/FAMILY/SIGNIFICANT OTHER GOALS STATEMENT, PT EVAL
Spoke with pharmacist; we do not do compounded prescriptions at this time. Insurance will not cover.   Pt to make f/u appt soon.    rdf  ----- Message from Lucretia Villasenor MA sent at 3/30/2023 11:39 AM CDT -----  Contact: self [phone]  HEY CAN YOU PLEASE CALL THEM TO CORRECT THE READING OF THE PRESCRIPTION.  ----- Message -----  From: Iliana Painting  Sent: 3/30/2023  11:21 AM CDT  To: Margi Vides Staff    Patient called in this morning concerning reading the prescription a  certain way semaglutide, weight loss, (WEGOVY) 0.25 mg/0.5 mL PnIj. Please call back if necessary [phone]            [facility] [address] [place] [address]  [phone]       Willing to work with PT, wants to walk.

## 2023-04-18 NOTE — OCCUPATIONAL THERAPY INITIAL EVALUATION ADULT - WEIGHT-BEARING RESTRICTIONS: STAND/SIT, REHAB EVAL
[facility]   Pre-operative Evaluation     Patient: Ashanti Puckett Date of Service: 2023   : 1976    46 year old female        CHIEF COMPLAINT:  Pre-op exam        HISTORY OF PRESENT ILLNESS     Diagnosis: Adnexal mass; Colon cancer  Procedure: TOTAL LAPAROSCOPIC HYSTERECTOMY, ROBOT-ASSISTED, USING DA MELANIE XI, WITH POSSIBLE PELVIC/PERIAORTIC LYMPHADENECTOMY, BILATERAL SALPINGO-OOPHORECTOMY, ROBOT-ASSISTED, USING DA MELANIE XI, POSSIBLE EXPLORATORY LAPAROTOMY, POSSIBLE OMENTAL BIOPSIES, POSSIBLE PERITONEAL BIOPSIES  Anesthesia: General  Surgeon: Marlys Steen MD  Location of Surgery: [facility] Main OR  Date of Surgery: 2023       PREOPERATIVE ASSESSMENT    Ashanti Puckett is a 46 year old year old female who is seen today for pre-op examination.     Was seen by Dr. Steen for her condition. Dr. Steen's examination and impression of her condition was that she would benefit from surgery and Dr. Steen proposed surgical procedure named above and Ashanti Puckett agreed with procedure. Patient denies any anesthesia allergy, latex allergy, sedation/intubation complication, hypothermic complications, shock. She has undergone previous surgical procedures and denies having any anesthetic complications nor has received any blood products. She is able to do activities without limitation.     Patient reports they are at their usual health, they have the following medical conditions: Hypothyroidism, Asthma, GERD, History of Thyroid cancer, and Colon Cancer. She reports she is compliant with her medications. She is able to walk at least >3 blocks and 4 flights of stairs. She does not experience any exertional chest pain. She denies reports she has always had easy bruising, but denies any bleeding. She denies any HA, dizziness, chest pain, sob, n/v/d, or dysuria. She denies any blood in her urine or stool.    PAST MEDICAL, FAMILY AND SOCIAL HISTORY       Past Medical History:   Diagnosis Date    • Allergic rhinitis    • Anemia    • Anxiety    • ASCUS 10/11/2000   • Asthma    • Fracture    • Gastroesophageal reflux disease    • Hypothyroidism, postsurgical     , R thyroidectomy. 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 Parker   • Esophagogastroduodenoscopy transoral flex w/bx single or mult  2021    Affi, no Barretts, reflux, continue PPI   • Foot/toes surgery proc unlisted  2006    Right metatarsal surgery   • Foot/toes surgery proc unlisted      right foot  and    • Hysteroscopy endometrial ablation  2010   • Lap colectomy prt w/an/colproc  2022    Dr. Ruiz, Lap sigmoid colon resection for colon cancer   • Laparoscopy,cholyst w/ cholgpy  2009   • Ligate fallopian tube  06/10/2006   • Removal gallbladder     • Thyroidectomy,elijah,ltd neck surg  2003    R thyroidectomy   • Us thyroid only  10/2010    Dr Magill, nodules decresed in size       ALLERGIES:   Allergen Reactions   • Sudafed [Pseudoephedrine] ANXIETY       Current Outpatient Medications   Medication Sig   • metroNIDAZOLE (FLAGYL) 500 MG tablet Take 1 tablet by mouth as directed for 3 doses. Take 1 tablet the day before surgery at 12 noon, 3pm, and 10pm.   • neomycin (MYCIFRADIN) 500 MG tablet Take 2 tablets by mouth as directed for 3 doses. Take 2 tablets the day before surgery at 12 noon, 3pm, and 10pm.   • hydroCHLOROthiazide (HYDRODIURIL) 25 MG tablet Take 1 tablet by mouth daily.   • levothyroxine (Synthroid) 125 MCG tablet Take 2 tablets by mouth daily.   • capecitabine (XELODA) 500 MG tablet Take 3 tablets by mouth in the morning and 3 tablets  in the evening for 14 days followed by 7 days off.   • prochlorperazine (COMPAZINE) 10 MG tablet Take 1 tablet by mouth every 6 hours as needed for Nausea or Vomiting.   • ondansetron (ZOFRAN) 8 MG tablet Take 1 tablet by mouth in the morning and 1 tablet in the evening. Take for 2 days post chemotherapy. And then can take up to every 8 hours as needed for nausea.   • famotidine (PEPCID) 20 MG tablet Take 1 tablet by mouth in the morning and 1 tablet in the evening.   • enoxaparin (LOVENOX) 40 MG/0.4ML injectable solution Inject the contents of 1 syringe (0.4 mLs) into the skin daily.   • albuterol (VENTOLIN HFA) 108 (90 Base) MCG/ACT inhaler Inhale 2 puffs into the lungs every 4 hours as needed for Shortness of Breath or Wheezing.   • ibuprofen (MOTRIN) 200 MG tablet Take 200 mg by mouth every 6 hours as needed for Pain.   • Cholecalciferol (VITAMIN D) 1000 UNITS capsule Take 4,000 Units by mouth daily.   • vitamin B-12 (CYANOCOBALAMIN) 1000 MCG tablet Take 1,000 mcg by mouth daily.     • docusate sodium (COLACE) 100 MG capsule Take 1 capsule by mouth as needed for Constipation.     No current facility-administered medications for this visit.       Social History     Tobacco Use   • Smoking status: Never   • Smokeless tobacco: Never   Vaping Use   • Vaping Use: never used   Substance Use Topics   • Alcohol use: Yes     Comment: Few times / year   • Drug use: No       Family History   Problem Relation Age of Onset   • Hypertension Mother    • NEGATIVE FAMILY HX OF Mother         breast fibroadenoma   • Hypertension Father    • Asthma Brother         Brennan   • Hyperlipidemia Brother         Brennan   • Heart Brother         Dimas - extra heart valve   • Heart Maternal Grandmother    • Heart Paternal Grandfather    • Hypertension Brother    • Hypertension Brother          Past medical history, surgical history, medications, allergies, family history and social history reviewed and updated in Epic.    PHYSICAL EXAM      Vital Signs:      Vitals:    04/19/23 0843   BP: 120/82   BP Location: LUE - Left upper extremity   Patient Position: Sitting   Cuff Size: Large Adult   Pulse: 70   Resp: 12   Temp: 96.8 °F (36 °C)   TempSrc: Temporal   SpO2: 98%   Weight: 93.6 kg (206 lb 4.8 oz)   Height: 5' 2\" (1.575 m)   LMP: 03/19/2023       Body mass index is 37.73 kg/m².    ROS Negative except as noted in the above HPI.    Physical Exam  Vitals reviewed.   Constitutional:       General: She is not in acute distress.     Appearance: Normal appearance. She is obese. She is not ill-appearing, toxic-appearing or diaphoretic.   HENT:      Head: Normocephalic and atraumatic.   Eyes:      General: No scleral icterus.        Right eye: No discharge.         Left eye: No discharge.      Conjunctiva/sclera: Conjunctivae normal.      Pupils: Pupils are equal, round, and reactive to light.   Cardiovascular:      Rate and Rhythm: Normal rate and regular rhythm.      Pulses: Normal pulses.      Heart sounds: Normal heart sounds. No murmur heard.     No friction rub. No gallop.   Pulmonary:      Effort: Pulmonary effort is normal. No respiratory distress.      Breath sounds: Normal breath sounds. No stridor. No wheezing, rhonchi or rales.   Musculoskeletal:         General: Normal range of motion.   Skin:     General: Skin is warm and dry.   Neurological:      General: No focal deficit present.      Mental Status: She is alert and oriented to person, place, and time. Mental status is at baseline.      Gait: Gait normal.   Psychiatric:         Mood and Affect: Mood normal.         Behavior: Behavior normal.         Thought Content: Thought content normal.         Judgment: Judgment normal.          ASSESSMENT AND PLAN     RECOMMENDATIONS:  Preop, patient to undergo intermediate risk surgery. Stable functional capacity at >4 METs. No EKG changes, no active cardiac conditions. Patient may undergo surgery with no further workup pending stable lab  results.    Hold hydrochlorothiazide day of surgery.   Avoid NSAIDs 5-7 days prior to surgery.   Labs- CBC, BMP, UA pending.   EK2023: NSR.  Chest CT: ::: FINDINGS: LUNGS AND PLEURA Lungs:  Innumerable tiny nodules throughout posterior aspects of both lungs, unchanged dating back to at least 2022. 3 mm right lower lobe nodule is also unchanged (axial series 308, image 188). No new or suspicious nodules. Minimal dependent atelectasis. No focal consolidation.Pleura: No pleural effusion, thickening or calcification. MEDIASTINUM AND HILAR REGIONS  Lymph Nodes: There is no mediastinal or hilar lymphadenopathy. Central airways: Patent. Esophagus: No dilatation, wall thickening or mass is seen. Thoracic aorta: Normal caliber. Heart: Normal in size. Coronary arteries: No coronary artery calcifications are seen. Pericardium: Normal. No effusion, thickening or calcification. Pulmonary arteries: Normal caliber.  Early ambulation, compression stocks, and use of incentive spirometer after surgery for prevention of post-operative complications.      Ashanti Puckett appears clinical stable for surgery pending stable lab results.    Patient's medical conditions, proposed management plan, risks, benefits and alternatives were discussed with the patient in detail. The patient understands and is agreeable.    Patient to RTC PRN with PCP.       I appreciate the opportunity to contribute to the care of this patient.     Please feel free to call the clinic at [phone] with any questions or concerns.    NED[facility] Stauffer  [facility]    CC to referring Provider: Marlys Steen MD           full weight-bearing

## 2024-01-10 NOTE — ED ADULT NURSE NOTE - CHPI ED NUR SYMPTOMS POS
Problem: Discharge Planning  Goal: Discharge to home or other facility with appropriate resources  Outcome: Progressing     Problem: Skin/Tissue Integrity  Goal: Absence of new skin breakdown  Description: 1.  Monitor for areas of redness and/or skin breakdown  2.  Assess vascular access sites hourly  3.  Every 4-6 hours minimum:  Change oxygen saturation probe site  4.  Every 4-6 hours:  If on nasal continuous positive airway pressure, respiratory therapy assess nares and determine need for appliance change or resting period.  Outcome: Progressing     Problem: Safety - Adult  Goal: Free from fall injury  Outcome: Progressing     Problem: Confusion  Goal: Confusion, delirium, dementia, or psychosis is improved or at baseline  Description: INTERVENTIONS:  1. Assess for possible contributors to thought disturbance, including medications, impaired vision or hearing, underlying metabolic abnormalities, dehydration, psychiatric diagnoses, and notify attending LIP  2. [place] high risk fall precautions, as indicated  3. Provide frequent short contacts to provide reality reorientation, refocusing and direction  4. Decrease environmental stimuli, including noise as appropriate  5. Monitor and intervene to maintain adequate nutrition, hydration, elimination, sleep and activity  6. If unable to ensure safety without constant attention obtain sitter and review sitter guidelines with assigned personnel  7. Initiate Psychosocial CNS and Spiritual Care consult, as indicated  Outcome: Progressing      NAUSEA/CRAMPS/DECREASED EATING/DRINKING/VOMITING

## 2024-02-01 ENCOUNTER — EMERGENCY (EMERGENCY)
Facility: HOSPITAL | Age: 84
LOS: 1 days | Discharge: HOME | End: 2024-02-01
Attending: EMERGENCY MEDICINE | Admitting: EMERGENCY MEDICINE
Payer: MEDICARE

## 2024-02-01 VITALS
RESPIRATION RATE: 19 BRPM | SYSTOLIC BLOOD PRESSURE: 154 MMHG | WEIGHT: 154.98 LBS | HEIGHT: 69 IN | OXYGEN SATURATION: 96 % | HEART RATE: 85 BPM | DIASTOLIC BLOOD PRESSURE: 84 MMHG | TEMPERATURE: 98 F

## 2024-02-01 VITALS
OXYGEN SATURATION: 97 % | DIASTOLIC BLOOD PRESSURE: 81 MMHG | TEMPERATURE: 98 F | SYSTOLIC BLOOD PRESSURE: 142 MMHG | RESPIRATION RATE: 17 BRPM | HEART RATE: 94 BPM

## 2024-02-01 DIAGNOSIS — Z86.73 PERSONAL HISTORY OF TRANSIENT ISCHEMIC ATTACK (TIA), AND CEREBRAL INFARCTION WITHOUT RESIDUAL DEFICITS: ICD-10-CM

## 2024-02-01 DIAGNOSIS — E78.5 HYPERLIPIDEMIA, UNSPECIFIED: ICD-10-CM

## 2024-02-01 DIAGNOSIS — Z88.0 ALLERGY STATUS TO PENICILLIN: ICD-10-CM

## 2024-02-01 DIAGNOSIS — R25.1 TREMOR, UNSPECIFIED: ICD-10-CM

## 2024-02-01 DIAGNOSIS — I10 ESSENTIAL (PRIMARY) HYPERTENSION: ICD-10-CM

## 2024-02-01 DIAGNOSIS — Z91.018 ALLERGY TO OTHER FOODS: ICD-10-CM

## 2024-02-01 PROBLEM — I63.9 CEREBRAL INFARCTION, UNSPECIFIED: Chronic | Status: ACTIVE | Noted: 2021-01-07

## 2024-02-01 LAB
ANION GAP SERPL CALC-SCNC: 8 MMOL/L — SIGNIFICANT CHANGE UP (ref 5–17)
BASOPHILS # BLD AUTO: 0.07 K/UL — SIGNIFICANT CHANGE UP (ref 0–0.2)
BASOPHILS NFR BLD AUTO: 0.9 % — SIGNIFICANT CHANGE UP (ref 0–2)
BUN SERPL-MCNC: 21 MG/DL — SIGNIFICANT CHANGE UP (ref 7–23)
CALCIUM SERPL-MCNC: 9.8 MG/DL — SIGNIFICANT CHANGE UP (ref 8.4–10.5)
CHLORIDE SERPL-SCNC: 100 MMOL/L — SIGNIFICANT CHANGE UP (ref 96–108)
CO2 SERPL-SCNC: 26 MMOL/L — SIGNIFICANT CHANGE UP (ref 22–31)
CREAT SERPL-MCNC: 0.93 MG/DL — SIGNIFICANT CHANGE UP (ref 0.5–1.3)
EGFR: 80 ML/MIN/1.73M2 — SIGNIFICANT CHANGE UP
EOSINOPHIL # BLD AUTO: 0.09 K/UL — SIGNIFICANT CHANGE UP (ref 0–0.5)
EOSINOPHIL NFR BLD AUTO: 1.2 % — SIGNIFICANT CHANGE UP (ref 0–6)
GLUCOSE SERPL-MCNC: 106 MG/DL — HIGH (ref 70–99)
HCT VFR BLD CALC: 42.2 % — SIGNIFICANT CHANGE UP (ref 39–50)
HGB BLD-MCNC: 14.8 G/DL — SIGNIFICANT CHANGE UP (ref 13–17)
IMM GRANULOCYTES NFR BLD AUTO: 0.3 % — SIGNIFICANT CHANGE UP (ref 0–0.9)
LYMPHOCYTES # BLD AUTO: 1.57 K/UL — SIGNIFICANT CHANGE UP (ref 1–3.3)
LYMPHOCYTES # BLD AUTO: 20.6 % — SIGNIFICANT CHANGE UP (ref 13–44)
MCHC RBC-ENTMCNC: 31.4 PG — SIGNIFICANT CHANGE UP (ref 27–34)
MCHC RBC-ENTMCNC: 35.1 GM/DL — SIGNIFICANT CHANGE UP (ref 32–36)
MCV RBC AUTO: 89.4 FL — SIGNIFICANT CHANGE UP (ref 80–100)
MONOCYTES # BLD AUTO: 0.64 K/UL — SIGNIFICANT CHANGE UP (ref 0–0.9)
MONOCYTES NFR BLD AUTO: 8.4 % — SIGNIFICANT CHANGE UP (ref 2–14)
NEUTROPHILS # BLD AUTO: 5.23 K/UL — SIGNIFICANT CHANGE UP (ref 1.8–7.4)
NEUTROPHILS NFR BLD AUTO: 68.6 % — SIGNIFICANT CHANGE UP (ref 43–77)
NRBC # BLD: 0 /100 WBCS — SIGNIFICANT CHANGE UP (ref 0–0)
PLATELET # BLD AUTO: 152 K/UL — SIGNIFICANT CHANGE UP (ref 150–400)
POTASSIUM SERPL-MCNC: 4.2 MMOL/L — SIGNIFICANT CHANGE UP (ref 3.5–5.3)
POTASSIUM SERPL-SCNC: 4.2 MMOL/L — SIGNIFICANT CHANGE UP (ref 3.5–5.3)
RBC # BLD: 4.72 M/UL — SIGNIFICANT CHANGE UP (ref 4.2–5.8)
RBC # FLD: 12.8 % — SIGNIFICANT CHANGE UP (ref 10.3–14.5)
SODIUM SERPL-SCNC: 134 MMOL/L — LOW (ref 135–145)
WBC # BLD: 7.62 K/UL — SIGNIFICANT CHANGE UP (ref 3.8–10.5)
WBC # FLD AUTO: 7.62 K/UL — SIGNIFICANT CHANGE UP (ref 3.8–10.5)

## 2024-02-01 PROCEDURE — G1004: CPT

## 2024-02-01 PROCEDURE — 99284 EMERGENCY DEPT VISIT MOD MDM: CPT

## 2024-02-01 PROCEDURE — 36415 COLL VENOUS BLD VENIPUNCTURE: CPT

## 2024-02-01 PROCEDURE — 70450 CT HEAD/BRAIN W/O DYE: CPT | Mod: 26,MG

## 2024-02-01 PROCEDURE — 99284 EMERGENCY DEPT VISIT MOD MDM: CPT | Mod: 25

## 2024-02-01 PROCEDURE — 85025 COMPLETE CBC W/AUTO DIFF WBC: CPT

## 2024-02-01 PROCEDURE — 70450 CT HEAD/BRAIN W/O DYE: CPT | Mod: MG

## 2024-02-01 PROCEDURE — 80048 BASIC METABOLIC PNL TOTAL CA: CPT

## 2024-02-01 RX ORDER — SODIUM CHLORIDE 9 MG/ML
1000 INJECTION INTRAMUSCULAR; INTRAVENOUS; SUBCUTANEOUS ONCE
Refills: 0 | Status: COMPLETED | OUTPATIENT
Start: 2024-02-01 | End: 2024-02-01

## 2024-02-01 RX ADMIN — SODIUM CHLORIDE 1000 MILLILITER(S): 9 INJECTION INTRAMUSCULAR; INTRAVENOUS; SUBCUTANEOUS at 11:22

## 2024-02-01 NOTE — ED PROVIDER NOTE - PATIENT PORTAL LINK FT
You can access the FollowMyHealth Patient Portal offered by [facility] by registering at the following website: [email or web address]. By joining [employer]’s FollowMyHealth portal, you will also be able to view your health information using other applications (apps) compatible with our system.

## 2024-02-01 NOTE — ED ADULT TRIAGE NOTE - CHIEF COMPLAINT QUOTE
"I have body wide pain like an electrical shock since 6am today". Notes recent workup for "possible parkinson's and is scheduled to see PCP in 2 weeks." Tremors in triage.

## 2024-02-01 NOTE — ED PROVIDER NOTE - CLINICAL SUMMARY MEDICAL DECISION MAKING FREE TEXT BOX
82 y/o m hx HTN, HLD, CVA presents c/o worsening tremor over the past 1.5 years, having intermittent pains which start in his head and go down through to his toes, currently with no pain in ED.  Vitals unremarkable, CT head neg, labs wnl.  Pt seen by case management who planned to have PT eval for possible home PT but pt not wanting to wait for eval, is ambulatory on his own in ED with a cane which he walks with at baseline and with daughter who helps care for him, feels comfortable going home, has neurology to f/u with

## 2024-02-01 NOTE — ED PROVIDER NOTE - OBJECTIVE STATEMENT
84 y/o m hx HTN, HLD, CVA presents c/o tremor to both hands which has gradually become worse over the past 1.5 years since it started.  Pt stating he also has been having pains which start in the front of his head and he states "the pain moves through my body to my toes."  Pt stating these episodes of pain are brief, currently with no pain.  Pt reports he was told last year by pmd that he could have parkinson's and was referred to neuro but didn't go.  Pt in ED today with his daughter who reports she has an appointment upcoming with a neurologist for him.

## 2024-02-01 NOTE — ED PROVIDER NOTE - TEMPLATE, MLM
-Last Hbg A1c (10/2022): 10.0%  -Mildly elevated fasting blood sugar readings  -Healthy eating habits + increase physical activity + weight loss  -Deferred foot exam until the next office visit  -Referral for Ophtho placed  -On statin therapy  -Labs ordered  -Continue current medical management: Metformin 1000mg BID, Rybelsus 3mg QD  -Will make adjustments to medication if needed once labs are completed   Neuro

## 2024-02-01 NOTE — ED ADULT NURSE NOTE - OBJECTIVE STATEMENT
83y M pmHx HTN presents to ED c/o "feeling something inside my brain and electrical shock waves through my body" since May of 2023, a/w tremors to all extremities. Endorses tremors, worse when fatigued, "electrical shocks" from head to toe, lightheadedness. Denies syncope, HA, numbness/tingling, focal weakness, vision changes, N/V, CP/SOB. Pt 83y M pmHx HTN presents to ED c/o "feeling something inside my brain and electrical shock waves through my body" since May of 2023, a/w tremors to all extremities. Endorses tremors, worse when fatigued, "electrical shocks" from head to toe, lightheadedness. Denies syncope, HA, numbness/tingling, focal weakness, vision changes, N/V, CP/SOB. Pt reports concern for Parkinsons, has outpt appointment with neurology in 2 weeks. Pt ambulates with walker at home but notes difficulty with tremors. Tremulous on assessment. Strength and sensory intact. Daughter at bedside, pt and daughter requesting resources for assisted living facilities, case management/SW aware. Pt AAOx4, speaking in full and clear sentences, NAD at this time.

## 2024-02-01 NOTE — ED ADULT NURSE NOTE - NSFALLRISKINTERV_ED_ALL_ED

## 2024-02-01 NOTE — ED ADULT NURSE NOTE - NS ED NURSE RECORD ANOTHER VITAL SIGN
Depression Screening and Follow-up Plan: Patient assessed for underlying major depression  Brief counseling provided and recommend additional follow-up/re-evaluation next office visit  Continue regular follow-up with their mental health provider who is managing their mental health condition(s)  Patient advised to follow-up with PCP for further management  Assessment/Plan:         Problem List Items Addressed This Visit        Digestive    Gastroesophageal reflux disease without esophagitis - Primary     Patient to continue with present therapy and decrease caffeine, avoid ETOH and smoking to decrease acid production  Pt should also cease eating prior to bedtime and avoid excessive fluid intake prior to sleep  May use antacids as needed for breakthrough GERD  All pateint questions answered today about this condition  Endocrine    Hypothyroidism     Patient to continue current dose of thyroid medicine and recheck TSH in 6 months              Respiratory    Severe obstructive sleep apnea     Patient instructed to continue using CPAP as directed to decrease episodes of apnea to minimize risk of cardiac complications  Pt instructed to kep  machine in clean working order and to call if any replacement parts are needed  Cardiovascular and Mediastinum    Hypertension     Patient is stable with current anti-hypertensive medicine and continue to follow a low sodium diet and take current medication  All questions about this condition were answered today  Other    BRIT (generalized anxiety disorder)     Patient to continue utilizing medical therapy as well as counseling sources as applicable to condition  If suicidal thought or fear of suicide attempt, to call 911 and seek help immediately  Medications and therapy reviewed today and all patient  questions answered today             Tobacco use     Patient encouraged to quit using tobacco for that increases their risk for COPD, lung cancer,stroke, oral cancer and heart disease  If patient does not want to quit they should let me know  when they are interested in quitting  There are numerous options to use to quit and we can discuss them  Depression     Patient to continue utilizing medical therapy as well and counseling sources as applicable for condition  If  suicidal thought or fear of suicide to contact 911 and seek help immediately  Meds reviewed and patient questions answered today           Class 3 severe obesity due to excess calories with serious comorbidity and body mass index (BMI) of 50 0 to 59 9 in [facility])     Patient to increase exercise and partake of a diet with less calories to promote  weight loss             Other Visit Diagnoses     Well adult exam                Subjective:      Patient ID: Katina Garcia is a 27 y o  female  HPI    The following portions of the patient's history were reviewed and updated as appropriate:   Past Medical History:  She has a past medical history of Anxiety, Asthma, Class 3 severe obesity due to excess calories with serious comorbidity and body mass index (BMI) of 50 0 to 59 9 in [facility]) (1/12/2022), Depression, Hypertension, and Hypothyroidism  ,  _______________________________________________________________________  Medical Problems:  does not have any pertinent problems on file ,  _______________________________________________________________________  Past Surgical History:   has no past surgical history on file ,  _______________________________________________________________________  Family History:  family history is not on file ,  _______________________________________________________________________  Social History:   reports that she has been smoking  She has been smoking about 0 50 packs per day  She has never used smokeless tobacco  She reports previous alcohol use  She reports current drug use   Drug: Marijuana  ,  _______________________________________________________________________  Allergies:  has No Known Allergies     _______________________________________________________________________  Current Outpatient Medications   Medication Sig Dispense Refill    albuterol (2 5 mg/3 mL) 0 083 % nebulizer solution Take 3-6 mL (2 5-5 mg total) by nebulization every 6 (six) hours as needed for wheezing or shortness of breath 75 mL 1    albuterol (PROVENTIL HFA,VENTOLIN HFA) 90 mcg/act inhaler Inhale 2 puffs every 6 (six) hours as needed for wheezing      ALPRAZolam (XANAX) 0 5 mg tablet Take 1 tablet (0 5 mg total) by mouth 2 (two) times a day as needed for anxiety 60 tablet 1    ARIPiprazole (ABILIFY) 5 mg tablet Take 1 tablet (5 mg total) by mouth daily 30 tablet 1    Atrovent HFA 17 MCG/ACT inhaler INHALE 2 PUFFS EVERY 6 (SIX) HOURS   buPROPion (WELLBUTRIN SR) 150 mg 12 hr tablet TAKE 1 TABLET BY MOUTH TWICE A DAY 60 tablet 1    divalproex sodium (DEPAKOTE ER) 500 mg 24 hr tablet Take 1,000 mg by mouth daily at bedtime      ferrous sulfate 325 (65 Fe) mg tablet Take 1 tablet (325 mg total) by mouth daily 30 tablet 0    furosemide (LASIX) 40 mg tablet Take 1 tablet (40 mg total) by mouth daily 30 tablet 1    lisinopril (ZESTRIL) 20 mg tablet Take 20 mg by mouth daily      montelukast (SINGULAIR) 10 mg tablet Take 10 mg by mouth daily at bedtime      potassium chloride (K-DUR,KLOR-CON) 20 mEq tablet Take 1 tablet (20 mEq total) by mouth daily 30 tablet 5    sertraline (ZOLOFT) 100 mg tablet Take 2 tablets (200 mg total) by mouth daily 180 tablet 1    Symbicort 80-4 5 MCG/ACT inhaler Inhale 2 puffs 2 (two) times a day      traZODone (DESYREL) 100 mg tablet TAKE 1 TABLET BY MOUTH EVERYDAY AT BEDTIME 30 tablet 1     No current facility-administered medications for this visit      _______________________________________________________________________  Review of Systems      Objective:   There were no vitals filed for this visit  There is no height or weight on file to calculate BMI       Physical Exam Yes

## 2024-02-01 NOTE — ED PROVIDER NOTE - ATTENDING APP SHARED VISIT CONTRIBUTION OF CARE
82 y/o m hx HTN, HLD, CVA presents c/o tremor to both hands which has gradually become worse over the past 1.5 years since it started.  Pt stating he also has been having pains which start in the front of his head and he states "the pain moves through my body to my toes."  Pt stating these episodes of pain are brief, currently with no pain.  Pt reports he was told last year by pmd that he could have parkinson's and was referred to neuro but didn't go.  Pt in ED today with his daughter who reports she has an appointment upcoming with a neurologist for him.    VSS  PE with mild shuffling gait but no focal neuro deficits    CT head neg, labs wnl.  Pt seen by case management who planned to have PT eval for possible home PT but pt not wanting to wait for eval, is ambulatory on his own in ED with a cane which he walks with at baseline and with daughter who helps care for him, feels comfortable going home, has neurology to f/u with

## 2024-02-01 NOTE — ED ADULT NURSE NOTE - NEURO BEHAVIOR
calm Area H Indication Text: Tumors in this location are included in Area H (eyelids, eyebrows, nose, lips, chin, ear, pre-auricular, post-auricular, temple, genitalia, hands, feet, ankles and areola).  Tissue conservation is critical in these anatomic locations.

## 2024-02-01 NOTE — ED PROVIDER NOTE - NEUROLOGICAL, MLM
+resting tremor to both hands, alert and oriented, no focal deficits, no motor or sensory deficits, CN II-XII intact, strength 5/5 b/l upper and lower ext, sensation intact distally b/l upper and lower ext

## 2024-03-25 NOTE — ED ADULT TRIAGE NOTE - ADDITIONAL SAFETY/BANDS...
Your cardiologist has ordered cardiac rehabilitation for you as part of your recovery process. Cardiac rehab is a very important way to help you to feel better and stronger more quickly as well as reduce the risks of heart problems in the future.    Cardiac rehabilitation services are provided at:  [address], [address]  [place], [place] [address]  [phone]    An appointment has been scheduled  for your initial assessment to begin cardiac rehabilitation.  This appointment is scheduled for:    Tuesday, May 7 at 1:30    Your information has been forwarded to the staff at this facility.  Please bring your insurance information and a list of your current medications with you.  If you need to change this appointment, please call [phone] as soon as possible to reschedule.  
Additional Safety/Bands:

## 2025-02-26 ENCOUNTER — INPATIENT (INPATIENT)
Facility: HOSPITAL | Age: 85
LOS: 4 days | Discharge: NURSING, REHAB OR LONG-TERM CARE | DRG: 149 | End: 2025-03-03
Attending: INTERNAL MEDICINE | Admitting: STUDENT IN AN ORGANIZED HEALTH CARE EDUCATION/TRAINING PROGRAM
Payer: MEDICARE

## 2025-02-26 VITALS
HEART RATE: 98 BPM | OXYGEN SATURATION: 97 % | WEIGHT: 315 LBS | SYSTOLIC BLOOD PRESSURE: 154 MMHG | HEIGHT: 69 IN | RESPIRATION RATE: 18 BRPM | DIASTOLIC BLOOD PRESSURE: 82 MMHG | TEMPERATURE: 98 F

## 2025-02-26 DIAGNOSIS — I67.1 CEREBRAL ANEURYSM, NONRUPTURED: ICD-10-CM

## 2025-02-26 DIAGNOSIS — Z78.9 OTHER SPECIFIED HEALTH STATUS: ICD-10-CM

## 2025-02-26 DIAGNOSIS — R42 DIZZINESS AND GIDDINESS: ICD-10-CM

## 2025-02-26 DIAGNOSIS — Z29.9 ENCOUNTER FOR PROPHYLACTIC MEASURES, UNSPECIFIED: ICD-10-CM

## 2025-02-26 DIAGNOSIS — I10 ESSENTIAL (PRIMARY) HYPERTENSION: ICD-10-CM

## 2025-02-26 DIAGNOSIS — G20.A1 PARKINSON'S DISEASE WITHOUT DYSKINESIA, WITHOUT MENTION OF FLUCTUATIONS: ICD-10-CM

## 2025-02-26 DIAGNOSIS — Z86.73 PERSONAL HISTORY OF TRANSIENT ISCHEMIC ATTACK (TIA), AND CEREBRAL INFARCTION WITHOUT RESIDUAL DEFICITS: ICD-10-CM

## 2025-02-26 DIAGNOSIS — E78.5 HYPERLIPIDEMIA, UNSPECIFIED: ICD-10-CM

## 2025-02-26 LAB
ALBUMIN SERPL ELPH-MCNC: 4.2 G/DL — SIGNIFICANT CHANGE UP (ref 3.3–5)
ALP SERPL-CCNC: 88 U/L — SIGNIFICANT CHANGE UP (ref 40–120)
ALT FLD-CCNC: 14 U/L — SIGNIFICANT CHANGE UP (ref 10–45)
ANION GAP SERPL CALC-SCNC: 11 MMOL/L — SIGNIFICANT CHANGE UP (ref 5–17)
APPEARANCE UR: CLEAR — SIGNIFICANT CHANGE UP
AST SERPL-CCNC: 15 U/L — SIGNIFICANT CHANGE UP (ref 10–40)
BASOPHILS # BLD AUTO: 0.08 K/UL — SIGNIFICANT CHANGE UP (ref 0–0.2)
BASOPHILS NFR BLD AUTO: 1.1 % — SIGNIFICANT CHANGE UP (ref 0–2)
BILIRUB SERPL-MCNC: 0.7 MG/DL — SIGNIFICANT CHANGE UP (ref 0.2–1.2)
BILIRUB UR-MCNC: NEGATIVE — SIGNIFICANT CHANGE UP
BUN SERPL-MCNC: 28 MG/DL — HIGH (ref 7–23)
CALCIUM SERPL-MCNC: 9.7 MG/DL — SIGNIFICANT CHANGE UP (ref 8.4–10.5)
CHLORIDE SERPL-SCNC: 104 MMOL/L — SIGNIFICANT CHANGE UP (ref 96–108)
CK MB CFR SERPL CALC: 1.6 NG/ML — SIGNIFICANT CHANGE UP (ref 0–6.7)
CK SERPL-CCNC: 56 U/L — SIGNIFICANT CHANGE UP (ref 30–200)
CO2 SERPL-SCNC: 27 MMOL/L — SIGNIFICANT CHANGE UP (ref 22–31)
COLOR SPEC: YELLOW — SIGNIFICANT CHANGE UP
CREAT SERPL-MCNC: 0.96 MG/DL — SIGNIFICANT CHANGE UP (ref 0.5–1.3)
DIFF PNL FLD: NEGATIVE — SIGNIFICANT CHANGE UP
EGFR: 77 ML/MIN/1.73M2 — SIGNIFICANT CHANGE UP
EGFR: 77 ML/MIN/1.73M2 — SIGNIFICANT CHANGE UP
EOSINOPHIL # BLD AUTO: 0.06 K/UL — SIGNIFICANT CHANGE UP (ref 0–0.5)
EOSINOPHIL NFR BLD AUTO: 0.8 % — SIGNIFICANT CHANGE UP (ref 0–6)
FLUAV AG NPH QL: SIGNIFICANT CHANGE UP
FLUBV AG NPH QL: SIGNIFICANT CHANGE UP
GLUCOSE SERPL-MCNC: 122 MG/DL — HIGH (ref 70–99)
GLUCOSE UR QL: NEGATIVE MG/DL — SIGNIFICANT CHANGE UP
HCT VFR BLD CALC: 38.5 % — LOW (ref 39–50)
HGB BLD-MCNC: 13.4 G/DL — SIGNIFICANT CHANGE UP (ref 13–17)
IMM GRANULOCYTES NFR BLD AUTO: 0.3 % — SIGNIFICANT CHANGE UP (ref 0–0.9)
KETONES UR-MCNC: NEGATIVE MG/DL — SIGNIFICANT CHANGE UP
LEUKOCYTE ESTERASE UR-ACNC: ABNORMAL
LYMPHOCYTES # BLD AUTO: 1.33 K/UL — SIGNIFICANT CHANGE UP (ref 1–3.3)
LYMPHOCYTES # BLD AUTO: 18.7 % — SIGNIFICANT CHANGE UP (ref 13–44)
MAGNESIUM SERPL-MCNC: 2.5 MG/DL — SIGNIFICANT CHANGE UP (ref 1.6–2.6)
MCHC RBC-ENTMCNC: 31.9 PG — SIGNIFICANT CHANGE UP (ref 27–34)
MCHC RBC-ENTMCNC: 34.8 G/DL — SIGNIFICANT CHANGE UP (ref 32–36)
MCV RBC AUTO: 91.7 FL — SIGNIFICANT CHANGE UP (ref 80–100)
MONOCYTES # BLD AUTO: 0.57 K/UL — SIGNIFICANT CHANGE UP (ref 0–0.9)
MONOCYTES NFR BLD AUTO: 8 % — SIGNIFICANT CHANGE UP (ref 2–14)
NEUTROPHILS # BLD AUTO: 5.04 K/UL — SIGNIFICANT CHANGE UP (ref 1.8–7.4)
NEUTROPHILS NFR BLD AUTO: 71.1 % — SIGNIFICANT CHANGE UP (ref 43–77)
NITRITE UR-MCNC: NEGATIVE — SIGNIFICANT CHANGE UP
NRBC BLD AUTO-RTO: 0 /100 WBCS — SIGNIFICANT CHANGE UP (ref 0–0)
PH UR: 7.5 — SIGNIFICANT CHANGE UP (ref 5–8)
PHOSPHATE SERPL-MCNC: 2.5 MG/DL — SIGNIFICANT CHANGE UP (ref 2.5–4.5)
PLATELET # BLD AUTO: 144 K/UL — LOW (ref 150–400)
POTASSIUM SERPL-MCNC: 3.9 MMOL/L — SIGNIFICANT CHANGE UP (ref 3.5–5.3)
POTASSIUM SERPL-SCNC: 3.9 MMOL/L — SIGNIFICANT CHANGE UP (ref 3.5–5.3)
PROT SERPL-MCNC: 8.1 G/DL — SIGNIFICANT CHANGE UP (ref 6–8.3)
PROT UR-MCNC: NEGATIVE MG/DL — SIGNIFICANT CHANGE UP
RBC # BLD: 4.2 M/UL — SIGNIFICANT CHANGE UP (ref 4.2–5.8)
RBC # FLD: 12.5 % — SIGNIFICANT CHANGE UP (ref 10.3–14.5)
RSV RNA NPH QL NAA+NON-PROBE: SIGNIFICANT CHANGE UP
SARS-COV-2 RNA SPEC QL NAA+PROBE: SIGNIFICANT CHANGE UP
SODIUM SERPL-SCNC: 142 MMOL/L — SIGNIFICANT CHANGE UP (ref 135–145)
SP GR SPEC: >1.03 — HIGH (ref 1–1.03)
TROPONIN T, HIGH SENSITIVITY RESULT: 10 NG/L — SIGNIFICANT CHANGE UP (ref 0–51)
TROPONIN T, HIGH SENSITIVITY RESULT: 11 NG/L — SIGNIFICANT CHANGE UP (ref 0–51)
TSH SERPL-MCNC: 1.16 UIU/ML — SIGNIFICANT CHANGE UP (ref 0.27–4.2)
UROBILINOGEN FLD QL: 1 MG/DL — SIGNIFICANT CHANGE UP (ref 0.2–1)
WBC # BLD: 7.1 K/UL — SIGNIFICANT CHANGE UP (ref 3.8–10.5)
WBC # FLD AUTO: 7.1 K/UL — SIGNIFICANT CHANGE UP (ref 3.8–10.5)

## 2025-02-26 PROCEDURE — 99221 1ST HOSP IP/OBS SF/LOW 40: CPT

## 2025-02-26 PROCEDURE — 93010 ELECTROCARDIOGRAM REPORT: CPT

## 2025-02-26 PROCEDURE — 70498 CT ANGIOGRAPHY NECK: CPT | Mod: 26

## 2025-02-26 PROCEDURE — 99223 1ST HOSP IP/OBS HIGH 75: CPT | Mod: GC

## 2025-02-26 PROCEDURE — 70496 CT ANGIOGRAPHY HEAD: CPT | Mod: 26

## 2025-02-26 PROCEDURE — 70450 CT HEAD/BRAIN W/O DYE: CPT | Mod: 26

## 2025-02-26 PROCEDURE — 71045 X-RAY EXAM CHEST 1 VIEW: CPT | Mod: 26

## 2025-02-26 PROCEDURE — 99285 EMERGENCY DEPT VISIT HI MDM: CPT

## 2025-02-26 RX ORDER — ASPIRIN 325 MG
81 TABLET ORAL EVERY 24 HOURS
Refills: 0 | Status: DISCONTINUED | OUTPATIENT
Start: 2025-02-27 | End: 2025-03-03

## 2025-02-26 RX ORDER — METOCLOPRAMIDE HCL 10 MG
10 TABLET ORAL ONCE
Refills: 0 | Status: COMPLETED | OUTPATIENT
Start: 2025-02-26 | End: 2025-02-26

## 2025-02-26 RX ORDER — ACETAMINOPHEN 500 MG/5ML
650 LIQUID (ML) ORAL EVERY 6 HOURS
Refills: 0 | Status: DISCONTINUED | OUTPATIENT
Start: 2025-02-26 | End: 2025-03-03

## 2025-02-26 RX ORDER — AMLODIPINE BESYLATE 10 MG/1
10 TABLET ORAL EVERY 24 HOURS
Refills: 0 | Status: DISCONTINUED | OUTPATIENT
Start: 2025-02-27 | End: 2025-02-27

## 2025-02-26 RX ORDER — ACETAMINOPHEN 500 MG/5ML
650 LIQUID (ML) ORAL ONCE
Refills: 0 | Status: COMPLETED | OUTPATIENT
Start: 2025-02-26 | End: 2025-02-26

## 2025-02-26 RX ORDER — ATORVASTATIN CALCIUM 80 MG/1
10 TABLET, FILM COATED ORAL AT BEDTIME
Refills: 0 | Status: DISCONTINUED | OUTPATIENT
Start: 2025-02-26 | End: 2025-02-27

## 2025-02-26 RX ORDER — MECLIZINE HCL 12.5 MG
12.5 TABLET ORAL
Refills: 0 | Status: DISCONTINUED | OUTPATIENT
Start: 2025-02-26 | End: 2025-02-27

## 2025-02-26 RX ORDER — ENOXAPARIN SODIUM 100 MG/ML
40 INJECTION SUBCUTANEOUS EVERY 24 HOURS
Refills: 0 | Status: DISCONTINUED | OUTPATIENT
Start: 2025-02-26 | End: 2025-03-03

## 2025-02-26 RX ADMIN — ATORVASTATIN CALCIUM 10 MILLIGRAM(S): 80 TABLET, FILM COATED ORAL at 21:26

## 2025-02-26 RX ADMIN — Medication 10 MILLIGRAM(S): at 12:55

## 2025-02-26 RX ADMIN — Medication 650 MILLIGRAM(S): at 12:55

## 2025-02-26 RX ADMIN — Medication 1000 MILLILITER(S): at 12:56

## 2025-02-26 RX ADMIN — Medication 650 MILLIGRAM(S): at 17:28

## 2025-02-26 NOTE — ED PROVIDER NOTE - PROGRESS NOTE DETAILS
MD Juanita:   - trop 11 > 10 neg  - ua shows no e/o infection  - rvp negative  - cxr: IMPRESSION:    Lungs clear. No infiltrate pleural effusion or pneumothorax. Unremarkable   cardiac silhouette. No acute bone abnormality.  - CT: CT of the brain:  No acute intracranial injury  The results were discussed with   at 12:05 PM on 10/2/2024. This study   was performed on 10/2/2024 at 11:52 AM    CTA of the intracranial circulation.  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  Approximately 5 mm patulous appearance to the distal basilar artery with   the bilateral SCA's and left PCA emanating from this confluence. Cannot   rule out fusiform aneurysm.  Small 2.5 mm outpouching seen along the right posterior supraclinoid   internal carotid artery at the origin of the fetal origin of the PCA;   findings most likely represents an infundibulum though a tiny aneurysm   cannot be excluded.    CTA of the extracranial circulation.  No evidence of carotid stenosis.  - *** ADDENDUM # 1 ***    Please disregard the CTA dictation below as it was dictated separately.    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 No large vessel occlusion   or significant stenosis.  - pt evaluated by neurosurgery and case management. neurosurgery states aneurysms can be followed outpatient they will follow pt on admission but aneurysms unlikely related to pts sx. CM states pt needs to be admitted in order to be placed in rehab then NH. d/w diego who accepts pt for admission

## 2025-02-26 NOTE — ED PROVIDER NOTE - CARE PLAN
Principal Discharge DX:	Dizziness   1 Principal Discharge DX:	Dizziness  Secondary Diagnosis:	Brain aneurysm  Secondary Diagnosis:	Physical deconditioning

## 2025-02-26 NOTE — H&P ADULT - NSHPPHYSICALEXAM_GEN_ALL_CORE
General: NAD, appears comfortable, resting tremor noted    HEENT:  PERRL, anicteric sclera  Cardiovascular:  RRR  Respiratory: CTA B/L  Gastrointestinal: soft, NT/ND; +BSx4  Extremities: no edema to LE, Hammer toes noted   Vascular: 2+ radial pulses  Neurological: AAOx3; no focal deficits General: NAD, appears comfortable, resting tremor noted    HEENT:  PERRL, anicteric sclera  Cardiovascular:  RRR  Respiratory: CTA B/L  Gastrointestinal: soft, NT/ND; +BSx4  Extremities: no edema to LE, Hammer toes   Vascular: 2+ radial pulses  Neurological: AAOx3; no focal deficits noted

## 2025-02-26 NOTE — H&P ADULT - HISTORY OF PRESENT ILLNESS
85 y/o with PMHx of Parkinson's (Carbidopa-Levodopa ), CVA (2020), HTN, HLD, Hammertoe P/W acute on chronic sensation of dizziness and room spinning every time he moved his head and then it resolved on it's on. Pt states he has had this since last year around summer time , has tried meclizine in the past but that hasn't helped him at all. Reports dizziness when he rotates his head to the Right side, denies dizziness when rotating to the left side. As per chart review, pt has been seen in the ED prior for similar complaint and was instructed to f/u with Neurology outpatient but f/u was lost.   Additionally, pt and daughter report that he has been having increasing difficulty with ADL's and mobility. Pt walks with a walker at home but over last 2 months due to generalized weakness pt is having hard time with mobility and balance. Both are in agreement that given his increased weakness and age, pt would like to go to a Nursing Home. Lastly pt reports he hasn't taking his Parkinson's medication for the past 2 month's because it was making him very drowsy and tired. + At this time, Pt denies chest pain, sob, palpitations, abdominal pain, nausea, vomiting, headache or diarrhea.         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 No large vessel occlusion   or significant stenosis. Please read radiology report for more info.  Interventions: Tylenol 650mg x 1  Consults: NSGY 83 y/o with PMHx of Parkinson's (Carbidopa-Levodopa ), Hx of CVA (2020) with no residual deficits, HTN, HLD, Hammertoe P/W acute on chronic sensation of dizziness and room spinning every time he moved his head and then it resolved on it's on. Pt states he has had this since last year around summer time, has tried meclizine in the past but that hasn't helped him at all. Reports dizziness when he rotates his head to the Right side, denies dizziness when rotating to the left side. As per chart review, pt has been seen in the ED prior for similar complaint and was instructed to f/u with Neurology outpatient but f/u was lost.   Additionally, pt and daughter report that he has been having increasing difficulty with ADL's and mobility. Pt walks with a walker at home but over last 2 months due to generalized weakness pt is having hard time with mobility and balance. Both are in agreement that given his increased weakness and age, pt would like to go to a Nursing Home. Lastly pt reports he hasn't taking his Parkinson's medication for the past 2 month's because it was making him very drowsy and tired. + At this time, Pt denies chest pain, sob, palpitations, abdominal pain, nausea, vomiting, headache or diarrhea.         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 No large vessel occlusion   or significant stenosis. Please read radiology report for more info.  Interventions: Tylenol 650mg x 1  Consults: NSGY 83 y/o with PMHx of Parkinson's (Carbidopa-Levodopa ), Hx of CVA (2020) with no residual deficits, HTN, HLD, Hammertoe P/W acute on chronic sensation of dizziness and room spinning every time he moved his head and then it resolved on it's on. Pt states he has had this since last year around summer time, has tried meclizine in the past but that hasn't helped him at all. Reports dizziness when he rotates his head to the Right side, denies dizziness when rotating to the left side. As per chart review, pt has been seen in the ED prior for similar complaint and was instructed to f/u with Neurology outpatient but f/u was lost.   Additionally, pt and daughter report that he has been having increasing difficulty with ADL's and mobility. Pt walks with a walker at home but over last 2 months due to generalized weakness pt is having hard time with mobility and balance. Both are in agreement that given his increased weakness and age, pt would like to go to a Nursing Home. Lastly pt reports he hasn't taking his Parkinson's medication for the past 2 month's because it was making him very drowsy and tired. + At this time, Pt denies chest pain, sob, palpitations, abdominal pain, nausea, vomiting, headache, dysuria or diarrhea.         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 No large vessel occlusion   or significant stenosis. Please read radiology report for more info.  Interventions: Tylenol 650mg x 1  Consults: NSGY 85 y/o with PMHx of Parkinson's (previously on Carbidopa-Levodopa ), Hx of CVA (2020) with no residual deficits, HTN, HLD, Hammertoe P/W acute on chronic sensation of dizziness and room spinning every time he moved his head and then it resolved on it's on. Pt states he has had this since last year around summer time, has tried meclizine in the past but that hasn't helped him at all. Reports dizziness when he rotates his head to the Right side, denies dizziness when rotating to the left side. As per chart review, pt has been seen in the ED prior for similar complaint and was instructed to f/u with Neurology outpatient but f/u was lost.   Additionally, pt and daughter report that he has been having increasing difficulty with ADL's and mobility. Pt walks with a walker at home but over last 2 months due to generalized weakness pt is having hard time with mobility and balance. Both are in agreement that given his increased weakness and age, pt would like to go to a Nursing Home. Lastly pt reports he hasn't taking his Parkinson's medication for the past 2 month's because it was making him very drowsy and tired. + At this time, Pt denies chest pain, sob, palpitations, abdominal pain, nausea, vomiting, headache, dysuria or diarrhea.         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 No large vessel occlusion   or significant stenosis. Please read radiology report for more info.  Interventions: Tylenol 650mg x 1  Consults: NSGY

## 2025-02-26 NOTE — ED ADULT NURSE NOTE - NS TRANSFER PATIENT BELONGINGS
Quality 226: Preventive Care And Screening: Tobacco Use: Screening And Cessation Intervention: Patient screened for tobacco use and is an ex/non-smoker Quality 110: Preventive Care And Screening: Influenza Immunization: Influenza Immunization Administered during Influenza season Quality 130: Documentation Of Current Medications In The Medical Record: Current Medications Documented Detail Level: Generalized Clothing

## 2025-02-26 NOTE — H&P ADULT - PROBLEM SELECTOR PLAN 2
Pt and daughter report that he has been having increasing difficulty with ADL's and mobility. Pt walks with a walker at home but over last 2 months due to generalized weakness pt is having hard time with mobility and balance. Both are in agreement that given his increased weakness and age, pt would like to go to a Nursing Home.    -PT/OT consulted  -SW consulted

## 2025-02-26 NOTE — H&P ADULT - PROBLEM SELECTOR PLAN 6
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   -no intervention per NSG   -outpt f/up

## 2025-02-26 NOTE — H&P ADULT - PROBLEM SELECTOR PLAN 7
F: None  E: Replete PRN  D: Regular  GI PPX: None  CODE: Full  Dispo: [facility] F: None  E: Replete PRN  D: Regular  DVT: Lovenox   GI PPX: None  CODE: Full  Dispo: [facility] Home med: Lipitor 10mg QHS    -C/w Home med

## 2025-02-26 NOTE — ED ADULT NURSE NOTE - NSFALLHARMRISKINTERV_ED_ALL_ED
Assistance OOB with selected safe patient handling equipment if applicable/Assistance with ambulation/Communicate risk of Fall with Harm to all staff, patient, and family/Encourage patient to sit up slowly, dangle for a short time, stand at bedside before walking/Monitor gait and stability/Orthostatic vital signs/Provide patient with walking aids/Provide visual cue: red socks, yellow wristband, yellow gown, etc/Reinforce activity limits and safety measures with patient and family/Bed in lowest position, wheels locked, appropriate side rails in place/Call bell, personal items and telephone in reach/Instruct patient to call for assistance before getting out of bed/chair/stretcher/Non-slip footwear applied when patient is off stretcher/[place] to call system/Physically safe environment - no spills, clutter or unnecessary equipment/Purposeful Proactive Rounding/Room/bathroom lighting operational, light cord in reach

## 2025-02-26 NOTE — ED PROVIDER NOTE - NS ED ROS FT
positive: ha, palpitations, dizziness, cp, diarrhea  negative: f/c/cough/rhinorrhea/coryza/sob/abdominal pain/n/v/dysuria/hematuria/leg edema/rash

## 2025-02-26 NOTE — ED ADULT NURSE NOTE - OBJECTIVE STATEMENT
85 y/o male presents to ED with dizziness, weakness, chest palpitations, and generalized body pain over the past few months. Pt reports sensation of the room spinning when he turns his head a certain way and has had episodes where he felt like he was going to pass out. Pt lives at home with an elderly family member and pt daughter at bedside is worried about the safety of his living situation/ability to care for himself at home. ambulatory with cane at baseline due to parksinons but pt is very weak. denies fevers/chills, numbness/tingling, n/v/d, congestion, cough, any new pain other than his chronic pain.

## 2025-02-26 NOTE — ED ADULT TRIAGE NOTE - CHIEF COMPLAINT QUOTE
From: More Andrade  To: Meri Limon MD  Sent: 2/13/2020 9:00 AM CST  Subject: Non-Urgent Medical Question    Dr. Limon,    I've been having lower back pain for the last 2 weeks. I don't remember a specific incident but I have been carrying a lot of equipment for my job up and down stairs because I'm not able to use my cart, due to the snow. Anyway, it started to get better at the end of the first week, but then I carried a pail of water and the next day it was even worse. Since last Saturday the pain has stayed the same. I'm fairly comfortable when sitting and lying down, but I can't walk very far and sometimes can't even stand up straight. I've been taking ibuprofen for pain, which helps a little but even though I've been taking it easy, it's not getting any better. I've tried stretches but the pain seems to get worse after that and the last time I did them I had bad muscle spasms a short time after. I'm not sure what you can do if I come in, but I wanted to get your advice. I've also use a heating pad which gives temporary relief. I appreciate any advice you can give me.     Tim Andrade   pt c/o dizziness, generalized weakness, palpitations since sunday. denies any new sx in last 24 hrs. hx parkinsons.  befast negative, pt ambulatory with cane. ecg in triage

## 2025-02-26 NOTE — H&P ADULT - NSHPLABSRESULTS_GEN_ALL_CORE
LABS:                         13.4   7.10  )-----------( 144      ( 26 Feb 2025 11:07 )             38.5     02-26    142  |  104  |  28[H]  ----------------------------<  122[H]  3.9   |  27  |  0.96    Ca    9.7      26 Feb 2025 11:07  Phos  2.5     02-26  Mg     2.5     02-26    TPro  8.1  /  Alb  4.2  /  TBili  0.7  /  DBili  x   /  AST  15  /  ALT  14  /  AlkPhos  88  02-26      Urinalysis Basic - ( 26 Feb 2025 11:07 )    Color: Yellow / Appearance: Clear / SG: >1.030 / pH: x  Gluc: 122 mg/dL / Ketone: Negative mg/dL  / Bili: Negative / Urobili: 1.0 mg/dL   Blood: x / Protein: Negative mg/dL / Nitrite: Negative   Leuk Esterase: Trace / RBC: 4 /HPF / WBC 1 /HPF   Sq Epi: x / Non Sq Epi: 0 /HPF / Bacteria: Negative /HPF      CARDIAC MARKERS ( 26 Feb 2025 13:08 )  x     / x     / x     / x     / 1.6 ng/mL      RADIOLOGY, EKG & ADDITIONAL TESTS: Reviewed.

## 2025-02-26 NOTE — H&P ADULT - PROBLEM SELECTOR PLAN 1
Acute on chronic sensation of dizziness and room spinning every time he moved his head and then it resolved on it's on. Reports dizziness when he rotates his head to the Right side, denies dizziness when rotating to the left side. As per chart review, pt has been seen in the ED prior for similar complaint and was instructed to f/u with Neurology outpatient but f/u was lost.  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 No large vessel occlusion   or significant stenosis. Please read radiology report for more info.  NSGY consulted in ED- No acute intervention at this time     -Neurology Consult for any further workup   -F/u Orthostatics- low suspicion as this has been a chronic issue for patient  -Fall precautions Acute on chronic sensation of dizziness and room spinning every time he moved his head and then it resolved on it's on. Reports dizziness when he rotates his head to the Right side, denies dizziness when rotating to the left side. As per chart review, pt has been seen in the ED prior for similar complaint and was instructed to f/u with Neurology outpatient but f/u was lost.  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 No large vessel occlusion   or significant stenosis. Please read radiology report for more info.  NSGY consulted in ED- No acute intervention at this time   Likely BPPV     -Neurology Consult in am for any recs on further workup and management   -Will try Meclizine 12.5mg BID PRN   -F/u Orthostatics- low suspicion as this has been a chronic issue for patient  -Fall precautions Acute on chronic sensation of dizziness and room spinning every time he moved his head and then it resolved on it's on. Reports dizziness when he rotates his head to the Right side, denies dizziness when rotating to the left side. As per chart review, pt has been seen in the ED prior for similar complaint and was instructed to f/u with Neurology outpatient but f/u was lost.  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 No large vessel occlusion   or significant stenosis. Please read radiology report for more info.  NSGY consulted in ED- No acute intervention at this time   Likely BPPV    -Neurology Consult in am for vertigo and parkinsons dx  -Will try Meclizine 12.5mg BID PRN   -F/u Orthostatics- low suspicion as this has been a chronic issue for patient  -Fall precautions

## 2025-02-26 NOTE — H&P ADULT - PROBLEM SELECTOR PLAN 3
Home med: Carbidopa-Levodopa 25-100mg 1 tab two times a day  Pt reports he stopped taking his medication two months a because it makes him drowsy, pt made his PCP aware Home med: Carbidopa-Levodopa 25-100mg 1 tab two times a day  Pt reports he stopped taking his medication two months a because it makes him drowsy, pt made his PCP aware    -Please call PCP to confirm that PCP is aware pt has not been taking his medication  -Hold off on starting medication at this time Previously on Carbidopa-Levodopa 25-100mg 1 tab two times a day  Pt reports he stopped taking his medication two months a because it makes him drowsy and increased dizziness, pt made his PCP aware. overall feels parkinson's symptoms are worse, wants to try other meds     -Please call PCP to confirm that PCP is aware pt has not been taking his medication  -Neuro consult in am   -fall precautions.

## 2025-02-26 NOTE — H&P ADULT - ASSESSMENT
85 y/o with PMHx of Parkinson's (Carbidopa-Levodopa ), CVA (2020), HTN, HLD, Hammertoe P/W acute on chronic sensation of dizziness and room spinning every time he moved his head, admitted for further evaluation/management and also for placement to a Nursing home. 83 y/o with PMHx of Parkinson's (previously on Carbidopa-Levodopa ), CVA (2020), HTN, HLD, Hammertoe P/W acute on chronic sensation of dizziness and room spinning every time he moved his head, admitted for further evaluation/management and also for placement to a Nursing home.

## 2025-02-26 NOTE — H&P ADULT - ATTENDING COMMENTS
85 y/o with PMHx of Parkinson's (previously on Carbidopa-Levodopa ), CVA (2020), HTN, HLD, Hammertoe P/W acute on chronic sensation of dizziness and room spinning every time he moved his head alont w/ worsening ADLs, admitted for further evaluation/management and also for possible placement to a Nursing home.  PT seen at bedside. in NAD, states he has no dizziness currently, described as room spinning sensation, worse when turning head. He also endorses worsening hand tremors and difficulty waking due to unsteadiness 2/2 underlying parkinsons dx, no longer taking sinemet due severe dizziness/drowsiness, self DCed 2 months ago. his PCP prescribed another medication which he was not able to get due to ?insurance issues. on PE: AO x3, no FND, no nystagmus/dysmetria, finger to nose intact, +b/l hand tremor, MS 5/5 b/l UE/LE, sensation intact.. +MMM, neck supple no JVD, s1s2 no murmur, CTA b/l lung fields, abd soft, NT. no LE edema.     Plan   -c/w meclizine prn   -neuro consult in am   -f/up orthostatics   -fall precautions   -per NSG recs, no intervention. outpt f/up for brain aneursym   -c/w home meds   -PT/SW

## 2025-02-26 NOTE — ED PROVIDER NOTE - CLINICAL SUMMARY MEDICAL DECISION MAKING FREE TEXT BOX
84M c PMH of Parkinson's, CVA, hammertoe p/w acute on chronic intermittent sensation of room spinning when he moves his head a certain way then self-resolves with associated tinnitus and headache,  palpitations, and generalized weakness. On exam, bp 154/82 VS otherwise wnl, non-focal neuro exam no remarkable exam findings.    ddx: s/s suggestive of bppv/peripheral vertigo given intermittent and positional. vs infection vs parkinsons    Plan:  - ekg: nsr with rbbb and pvcs (not seen on prior ekg) no beth/std/twi  - labs: bun 28 otherwise cbc and cmp unremarkable. trop 11 will repeat  - rvp neg  - ua  - cxr: IMPRESSION:    Lungs clear. No infiltrate pleural effusion or pneumothorax. Unremarkable   cardiac silhouette. No acute bone abnormality.  - ivf/reglan/tylenol  - sw to see pt to arrange nh, pt consulted

## 2025-02-26 NOTE — H&P ADULT - PROBLEM SELECTOR PLAN 4
Hx of CVA back in 2020, Pt at that time also presented with dizziness, CTH was negative. tPA was administered  for focal neurologic deficits of bilateral dysmetria, slurred speech, and left pronator drift. Monitored in MICU and was discharged on Aspirin 81mg, Plavix 75mg and Meclizine 12.5mg BID  Pt reports he doesn't take those medications any more because of a Nosebleed episode Jan 2021.    -Can consider starting on Aspirin 81mg Hx of CVA back in 2020, Pt at that time also presented with dizziness, CTH was negative. tPA was administered  for focal neurologic deficits of bilateral dysmetria, slurred speech, and left pronator drift. Monitored in MICU and was discharged on Aspirin 81mg, Plavix 75mg and Meclizine 12.5mg BID  Pt reports he doesn't take those medications any more because of a Nosebleed episode Jan 2021.    -Started on Aspirin 81mg Hx of CVA back in 2020, Pt at that time also presented with dizziness, CTH was negative. tPA was administered  for focal neurologic deficits of bilateral dysmetria, slurred speech, and left pronator drift. Monitored in MICU and was discharged on Aspirin 81mg, Plavix 75mg and Meclizine 12.5mg BID  Pt reports he doesn't take those medications any more because of a Nosebleed episode Jan 2021.    -c/w Aspirin 81mg and lipitor   -collateral in am

## 2025-02-26 NOTE — ED ADULT NURSE NOTE - CHIEF COMPLAINT QUOTE
pt c/o dizziness, generalized weakness, palpitations since sunday. denies any new sx in last 24 hrs. hx parkinsons.  befast negative, pt ambulatory with cane. ecg in triage

## 2025-02-26 NOTE — CONSULT NOTE ADULT - SUBJECTIVE AND OBJECTIVE BOX
HISTORY OF PRESENT ILLNESS:   84M c PMH of Parkinson's, CVA, hammertoe p/w acute on chronic intermittent sensation of room spinning when he moves his head a certain way then self-resolves with associated tinnitus and headache,  palpitations, and generalized weakness. daughter at bedside states he walks with walker but doesn't have  home health care and has been progressively having difficulty walking. she states she is not comfortable going home due to this and wants pt to go to a NH. pt reports + hx of similar sensation of room spinning x months. denies facial droop/slurred speech/numbness/weakness. daughter states pt had some diarrhea, no recent abx.     PAST MEDICAL & SURGICAL HISTORY:  HTN (hypertension)  CVA (cerebral vascular accident)  Parkinsons    No significant past surgical history        FAMILY HISTORY:      SOCIAL HISTORY:  Tobacco Use: denies  EtOH use: denies  Substance: denies     Allergies    avocado (Anaphylaxis (Mild); Angioedema (Mod to Severe); Headache; Other)  penicillin (Rash)    Intolerances  statins (Muscle Pain)      REVIEW OF SYSTEMS  Review of systems negative unless otherwise stated in HPI.       MEDICATIONS:  Antibiotics:    Neuro:    Anticoagulation:    OTHER:    IVF:      Vital Signs Last 24 Hrs  T(C): 36.5 (26 Feb 2025 13:46), Max: 36.7 (26 Feb 2025 10:42)  T(F): 97.7 (26 Feb 2025 13:46), Max: 98 (26 Feb 2025 10:42)  HR: 91 (26 Feb 2025 13:46) (90 - 98)  BP: 144/68 (26 Feb 2025 13:46) (144/68 - 154/82)  BP(mean): --  RR: 18 (26 Feb 2025 13:46) (18 - 18)  SpO2: 96% (26 Feb 2025 13:46) (90% - 98%)    Parameters below as of 26 Feb 2025 13:46  Patient On (Oxygen Delivery Method): room air        PHYSICAL EXAM:  Constitutional:  Eyes:  ENMT:  Neck:  Back:  Respiratory:  Cardiovascular:  Gastrointestinal:  Genitourinary:  Rectal:  Vascular:  Neurological:  Skin:    LABS:                        13.4   7.10  )-----------( 144      ( 26 Feb 2025 11:07 )             38.5     02-26    142  |  104  |  28[H]  ----------------------------<  122[H]  3.9   |  27  |  0.96    Ca    9.7      26 Feb 2025 11:07  Phos  2.5     02-26  Mg     2.5     02-26    TPro  8.1  /  Alb  4.2  /  TBili  0.7  /  DBili  x   /  AST  15  /  ALT  14  /  AlkPhos  88  02-26      Urinalysis Basic - ( 26 Feb 2025 11:07 )    Color: Yellow / Appearance: Clear / SG: >1.030 / pH: x  Gluc: 122 mg/dL / Ketone: Negative mg/dL  / Bili: Negative / Urobili: 1.0 mg/dL   Blood: x / Protein: Negative mg/dL / Nitrite: Negative   Leuk Esterase: Trace / RBC: 4 /HPF / WBC 1 /HPF   Sq Epi: x / Non Sq Epi: 0 /HPF / Bacteria: Negative /HPF      CULTURES:      RADIOLOGY & ADDITIONAL STUDIES:   HISTORY OF PRESENT ILLNESS:   Taken from chart:  "84M c PMH of Parkinson's, CVA, maddye p/w acute on chronic intermittent sensation of room spinning when he moves his head a certain way then self-resolves with associated tinnitus and headache,  palpitations, and generalized weakness. daughter at bedside states he walks with walker but doesn't have  home health care and has been progressively having difficulty walking. she states she is not comfortable going home due to this and wants pt to go to a NH. pt reports + hx of similar sensation of room spinning x months. denies facial droop/slurred speech/numbness/weakness. daughter states pt had some diarrhea, no recent abx." Pt unaware of aneurysms history. Denies any current headache, nausea, vomiting, changes in vision, neck stiffness, chest pain, shortness of breath.     PAST MEDICAL & SURGICAL HISTORY:  HTN (hypertension)  CVA (cerebral vascular accident)  Parkinsons    No significant past surgical history        FAMILY HISTORY:      SOCIAL HISTORY:  Tobacco Use: denies  EtOH use: denies  Substance: denies     Allergies    avocado (Anaphylaxis (Mild); Angioedema (Mod to Severe); Headache; Other)  penicillin (Rash)    Intolerances  statins (Muscle Pain)      REVIEW OF SYSTEMS  Review of systems negative unless otherwise stated in HPI.       MEDICATIONS:  Antibiotics:    Neuro:    Anticoagulation:    OTHER:    IVF:      Vital Signs Last 24 Hrs  T(C): 36.5 (26 Feb 2025 13:46), Max: 36.7 (26 Feb 2025 10:42)  T(F): 97.7 (26 Feb 2025 13:46), Max: 98 (26 Feb 2025 10:42)  HR: 91 (26 Feb 2025 13:46) (90 - 98)  BP: 144/68 (26 Feb 2025 13:46) (144/68 - 154/82)  BP(mean): --  RR: 18 (26 Feb 2025 13:46) (18 - 18)  SpO2: 96% (26 Feb 2025 13:46) (90% - 98%)    Parameters below as of 26 Feb 2025 13:46  Patient On (Oxygen Delivery Method): room air        PHYSICAL EXAM:  Constitutional: NAD  Eyes: Sclera clear, pupils 4mm and briskly reactive to light, EOMI  ENMT: mucous membranes moist  Neck: full range of motion,   Respiratory: normal work of breathing, symmetric chest wall rise  Cardiovascular: regular via radial pulse  Gastrointestinal: abdomen thin, soft, nontender, nondistended  Genitourinary: deferred  Rectal: deferred  Vascular: Peripheral pules palpable, extremities warm and dry  Neurological: AO3, following 2 step and simple commands briskly, able to name simple objects. Speech intact. Tongue midline. L facial but activates when told to smile. Sensation of face and all four extremities intact. Tremulous in BL UE. Pill-rolling tremor in BL hands. No drifts in all four extremities. Power 5/5 in all extremities.   Skin: warm and dry, no visible lesions  Psych: normal affect     LABS:                        13.4   7.10  )-----------( 144      ( 26 Feb 2025 11:07 )             38.5     02-26    142  |  104  |  28[H]  ----------------------------<  122[H]  3.9   |  27  |  0.96    Ca    9.7      26 Feb 2025 11:07  Phos  2.5     02-26  Mg     2.5     02-26    TPro  8.1  /  Alb  4.2  /  TBili  0.7  /  DBili  x   /  AST  15  /  ALT  14  /  AlkPhos  88  02-26      Urinalysis Basic - ( 26 Feb 2025 11:07 )    Color: Yellow / Appearance: Clear / SG: >1.030 / pH: x  Gluc: 122 mg/dL / Ketone: Negative mg/dL  / Bili: Negative / Urobili: 1.0 mg/dL   Blood: x / Protein: Negative mg/dL / Nitrite: Negative   Leuk Esterase: Trace / RBC: 4 /HPF / WBC 1 /HPF   Sq Epi: x / Non Sq Epi: 0 /HPF / Bacteria: Negative /HPF      CULTURES:      RADIOLOGY & ADDITIONAL STUDIES:    Assessment:   "84M c PMH of Parkinson's, CVA, hammertoe p/w acute on chronic intermittent sensation of room spinning when he moves his head a certain way then self-resolves with associated tinnitus and headache,  palpitations, and generalized weakness. daughter at bedside states he walks with walker but doesn't have  home health care and has been progressively having difficulty walking. she states she is not comfortable going home due to this and wants pt to go to a NH. pt reports + hx of similar sensation of room spinning x months. denies facial droop/slurred speech/numbness/weakness. daughter states pt had some diarrhea, no recent abx." Pt unaware of aneurysms history. Denies any current headache, nausea, vomiting, changes in vision, neck stiffness, chest pain, shortness of breath.   At this time patient is neurologically intact with residual L facial from previous stroke., Review of chart shows previously known Acomm aneurysm.     Plan:   - outpatient follow up with Dr. Sergio Borden   - all other care per primary team  - neurosurgery to sign off at this time  - please call back with any questions    Case discussed with Dr. Borden

## 2025-02-26 NOTE — ED PROVIDER NOTE - OBJECTIVE STATEMENT
84M c PMH of Parkinson's, CVA, hammertoe p/w acute on chronic intermittent sensation of room spinning when he moves his head a certain way then self-resolves with associated tinnitus and headache,  palpitations, and generalized weakness. daughter at bedside states he walks with walker but doesn't have  home health care and has been progressively having difficulty walking. she states she is not comfortable going home due to this and wants pt to go to a NH. pt reports + hx of similar sensation of room spinning x months. denies facial droop/slurred speech/numbness/weakness. daughter states pt had some diarrhea, no recent abx.     per triage note: "pt c/o dizziness, generalized weakness, palpitations since sunday. denies any new sx in last 24 hrs. hx parkinsons.  befast negative, pt ambulatory with cane. ecg in triage"

## 2025-02-27 DIAGNOSIS — R42 DIZZINESS AND GIDDINESS: ICD-10-CM

## 2025-02-27 PROCEDURE — 99233 SBSQ HOSP IP/OBS HIGH 50: CPT | Mod: GC

## 2025-02-27 RX ORDER — ATORVASTATIN CALCIUM 80 MG/1
40 TABLET, FILM COATED ORAL AT BEDTIME
Refills: 0 | Status: DISCONTINUED | OUTPATIENT
Start: 2025-02-27 | End: 2025-02-28

## 2025-02-27 RX ORDER — MECLIZINE HCL 12.5 MG
25 TABLET ORAL
Refills: 0 | Status: DISCONTINUED | OUTPATIENT
Start: 2025-02-27 | End: 2025-03-03

## 2025-02-27 RX ORDER — CLOPIDOGREL BISULFATE 75 MG/1
75 TABLET, FILM COATED ORAL DAILY
Refills: 0 | Status: DISCONTINUED | OUTPATIENT
Start: 2025-02-27 | End: 2025-02-27

## 2025-02-27 RX ADMIN — AMLODIPINE BESYLATE 10 MILLIGRAM(S): 10 TABLET ORAL at 08:03

## 2025-02-27 RX ADMIN — ATORVASTATIN CALCIUM 40 MILLIGRAM(S): 80 TABLET, FILM COATED ORAL at 21:14

## 2025-02-27 RX ADMIN — ENOXAPARIN SODIUM 40 MILLIGRAM(S): 100 INJECTION SUBCUTANEOUS at 08:03

## 2025-02-27 RX ADMIN — Medication 81 MILLIGRAM(S): at 08:03

## 2025-02-27 NOTE — PHYSICAL THERAPY INITIAL EVALUATION ADULT - PERTINENT HX OF CURRENT PROBLEM, REHAB EVAL
83 y/o with PMHx of Parkinson's (previously on Carbidopa-Levodopa ), Hx of CVA (2020) with no residual deficits, HTN, HLD, Hammertoe P/W acute on chronic sensation of dizziness and room spinning every time he moved his head and then it resolved on it's on. Pt states he has had this since last year around summer time, has tried meclizine in the past but that hasn't helped him at all. Reports dizziness when he rotates his head to the Right side, denies dizziness when rotating to the left side. As per chart review, pt has been seen in the ED prior for similar complaint and was instructed to f/u with Neurology outpatient but f/u was lost.   Additionally, pt and daughter report that he has been having increasing difficulty with ADL's and mobility. Pt walks with a walker at home but over last 2 months due to generalized weakness pt is having hard time with mobility and balance.

## 2025-02-27 NOTE — PHYSICAL THERAPY INITIAL EVALUATION ADULT - ADDITIONAL COMMENTS
Pt repots living with his cousin in an elevator building with no BELEN. Pt reports using cane or walker for ambulation and has difficulty performing ADLs/ functional mobility, receives help from cousin as needed.

## 2025-02-27 NOTE — OCCUPATIONAL THERAPY INITIAL EVALUATION ADULT - PERTINENT HX OF CURRENT PROBLEM, REHAB EVAL
83 y/o with PMHx of Parkinson's (previously on Carbidopa-Levodopa ), CVA (2020), HTN, HLD, Hammertoe P/W acute on chronic sensation of dizziness and room spinning every time he moved his head, admitted for further evaluation/management and also for placement to a Nursing home.

## 2025-02-27 NOTE — PROGRESS NOTE ADULT - ATTENDING COMMENTS
Patient seen this morning, reports feeling improved, vertigo resolved, denies headache, no change in vision. He is refusing to take Plavix because of epistaxis in 2020. Patient reports not taking any antiparkinson' s medications for months. No other complaints or events reported.    General: AOX3, NAD, sitting in bed, speaking in full sentences, no labored breathing on RA  HEENT: AT/NC, no facial asymmetry, no nystagmus,   Lungs: no crackles, no wheezes  Heart: RRR  Abdomen: soft, no tenderness, no abdominal distension  Extremities: mild tremors, no edema, no tenderness, no focal deficit     Labs, imaging reviewed    Vertigo  Parkinson's disease  History of CVA    Patient with resolved vertigo, suspect some component of vestibular vertigo. PT evaluation  Patient agreeable to referral to Parkinson's specialist   Patient refusing Plavix, continue ASA/Statins  SW follow-up Patient seen this morning, reports feeling improved, vertigo resolved, denies headache, no change in vision. He is refusing to take Plavix because of epistaxis in 2020. Patient reports not taking any antiparkinson' s medications for months. No other complaints or events reported.    General: AOX3, NAD, sitting in bed, speaking in full sentences, no labored breathing on RA  HEENT: AT/NC, no facial asymmetry, no nystagmus,   Lungs: no crackles, no wheezes  Heart: RRR  Abdomen: soft, no tenderness, no abdominal distension  Extremities: mild tremors, no edema, no tenderness, no focal deficit     Labs, imaging reviewed    Vertigo  Parkinson's disease  History of CVA  HTN    Patient with resolved vertigo, suspect some component of vestibular vertigo. PT evaluation  Patient agreeable to referral to Parkinson's specialist   Patient refusing Plavix, continue ASA/Statins  Will monitor BP and manage as needed, avoid overcorrection   SW follow-up

## 2025-02-27 NOTE — OCCUPATIONAL THERAPY INITIAL EVALUATION ADULT - ADDITIONAL COMMENTS
Pt states that he lives w/ his cousin in an apt w/ elevator access. Pt states that he required assistance w/ his ADLs/IADLs (cousin provided help as needed). Pt uses a walker and cane for ambulation.

## 2025-02-27 NOTE — CONSULT NOTE ADULT - ASSESSMENT
I M    85 y/o with PMHx of Parkinson's (previously on Carbidopa-Levodopa ), CVA (2020), HTN, HLD, Hammertoe P/W acute on chronic sensation of dizziness and room spinning every time he moved his head, admitted for further evaluation/management and also for placement to a Nursing home.    Problem/Plan - 1:  ·  Problem: Postural dizziness.   ·  Plan: Acute on chronic sensation of dizziness and room spinning every time he moved his head and then it resolved on it's on. Reports dizziness when he rotates his head to the Right side, denies dizziness when rotating to the left side. As per chart review, pt has been seen in the ED prior for similar complaint and was instructed to f/u with Neurology outpatient but f/u was lost.  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 No large vessel occlusion   or significant stenosis. Please read radiology report for more info.  NSGY consulted in ED- No acute intervention at this time   Likely BPPV    -Neurology Consult in am for vertigo and parkinsons dx  -Will try Meclizine 12.5mg BID PRN   -F/u Orthostatics- low suspicion as this has been a chronic issue for patient  -Fall precautions.    Problem/Plan - 2:  ·  Problem: Inability to perform activities of daily living.   ·  Plan: Pt and daughter report that he has been having increasing difficulty with ADL's and mobility. Pt walks with a walker at home but over last 2 months due to generalized weakness pt is having hard time with mobility and balance. Both are in agreement that given his increased weakness and age, pt would like to go to a Nursing Home.    -PT/OT consulted  -SW consulted.    Problem/Plan - 3:  ·  Problem: Parkinsons disease.   ·  Plan: Previously on Carbidopa-Levodopa 25-100mg 1 tab two times a day  Pt reports he stopped taking his medication two months a because it makes him drowsy and increased dizziness, pt made his PCP aware. overall feels parkinson's symptoms are worse, wants to try other meds     -Please call PCP to confirm that PCP is aware pt has not been taking his medication  -Neuro consult in am   -fall precautions.    Problem/Plan - 4:  ·  Problem: H/O: CVA (cerebrovascular accident).   ·  Plan: Hx of CVA back in 2020, Pt at that time also presented with dizziness, CTH was negative. tPA was administered  for focal neurologic deficits of bilateral dysmetria, slurred speech, and left pronator drift. Monitored in MICU and was discharged on Aspirin 81mg, Plavix 75mg and Meclizine 12.5mg BID  Pt reports he doesn't take those medications any more because of a Nosebleed episode Jan 2021.    -c/w Aspirin 81mg and lipitor   -collateral in am.    Problem/Plan - 5:  ·  Problem: HTN (hypertension).   ·  Plan: Home med: Norvasc 10mg QD    -C/w Home med.    Problem/Plan - 6:  ·  Problem: Brain aneurysm.  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   -no intervention per NSG   -outpt f/up.    Problem/Plan - 7:  ·  Problem: HLD (hyperlipidemia).  ·  Plan: Home med: Lipitor 10mg QHS    -C/w Home med.    Problem/Plan - 8:  ·  Problem: Prophylactic measure.   ·  Plan: F: None  E: Replete PRN  D: Regular  DVT: Lovenox   GI PPX: None  CODE: Full  Dispo: [facility].

## 2025-02-27 NOTE — PHYSICAL THERAPY INITIAL EVALUATION ADULT - GENERAL OBSERVATIONS, REHAB EVAL
Pt cleared for PT by RONEL Iglesias. OT Yesenia present t/o session. Pt received sitting EOB eating breakfast, +heplock, on RA, in NAD, agreeable to PT.

## 2025-02-27 NOTE — CONSULT NOTE ADULT - SUBJECTIVE AND OBJECTIVE BOX
Patient is a 84y old  Male who presents with a chief complaint of Vertigo (27 Feb 2025 07:15)      HPI:  83 y/o with PMHx of Parkinson's (previously on Carbidopa-Levodopa ), Hx of CVA (2020) with no residual deficits, HTN, HLD, Hammertoe P/W acute on chronic sensation of dizziness and room spinning every time he moved his head and then it resolved on it's on. Pt states he has had this since last year around summer time, has tried meclizine in the past but that hasn't helped him at all. Reports dizziness when he rotates his head to the Right side, denies dizziness when rotating to the left side. As per chart review, pt has been seen in the ED prior for similar complaint and was instructed to f/u with Neurology outpatient but f/u was lost.   Additionally, pt and daughter report that he has been having increasing difficulty with ADL's and mobility. Pt walks with a walker at home but over last 2 months due to generalized weakness pt is having hard time with mobility and balance. Both are in agreement that given his increased weakness and age, pt would like to go to a Nursing Home. Lastly pt reports he hasn't taking his Parkinson's medication for the past 2 month's because it was making him very drowsy and tired. + At this time, Pt denies chest pain, sob, palpitations, abdominal pain, nausea, vomiting, headache, dysuria or diarrhea.         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 No large vessel occlusion   or significant stenosis. Please read radiology report for more info.  Interventions: Tylenol 650mg x 1  Consults: NSGY (26 Feb 2025 17:30)    PAST MEDICAL & SURGICAL HISTORY:  HTN (hypertension)      CVA (cerebral vascular accident)      No significant past surgical history        MEDICATIONS  (STANDING):  amLODIPine   Tablet 10 milliGRAM(s) Oral every 24 hours  aspirin  chewable 81 milliGRAM(s) Oral every 24 hours  atorvastatin 10 milliGRAM(s) Oral at bedtime  enoxaparin Injectable 40 milliGRAM(s) SubCutaneous every 24 hours    MEDICATIONS  (PRN):  acetaminophen     Tablet .. 650 milliGRAM(s) Oral every 6 hours PRN Temp greater or equal to 38C (100.4F), Mild Pain (1 - 3)  meclizine 12.5 milliGRAM(s) Oral two times a day PRN Dizziness        FAMILY HISTORY:      CBC Full  -  ( 26 Feb 2025 11:07 )  WBC Count : 7.10 K/uL  RBC Count : 4.20 M/uL  Hemoglobin : 13.4 g/dL  Hematocrit : 38.5 %  Platelet Count - Automated : 144 K/uL  Mean Cell Volume : 91.7 fl  Mean Cell Hemoglobin : 31.9 pg  Mean Cell Hemoglobin Concentration : 34.8 g/dL  Auto Neutrophil # : x  Auto Lymphocyte # : x  Auto Monocyte # : x  Auto Eosinophil # : x  Auto Basophil # : x  Auto Neutrophil % : x  Auto Lymphocyte % : x  Auto Monocyte % : x  Auto Eosinophil % : x  Auto Basophil % : x      02-26    142  |  104  |  28[H]  ----------------------------<  122[H]  3.9   |  27  |  0.96    Ca    9.7      26 Feb 2025 11:07  Phos  2.5     02-26  Mg     2.5     02-26    TPro  8.1  /  Alb  4.2  /  TBili  0.7  /  DBili  x   /  AST  15  /  ALT  14  /  AlkPhos  88  02-26      Urinalysis Basic - ( 26 Feb 2025 11:07 )    Color: Yellow / Appearance: Clear / SG: >1.030 / pH: x  Gluc: 122 mg/dL / Ketone: Negative mg/dL  / Bili: Negative / Urobili: 1.0 mg/dL   Blood: x / Protein: Negative mg/dL / Nitrite: Negative   Leuk Esterase: Trace / RBC: 4 /HPF / WBC 1 /HPF   Sq Epi: x / Non Sq Epi: 0 /HPF / Bacteria: Negative /HPF        Radiology :     < from: CT Head No Cont (02.26.25 @ 12:40) >    ACC: [id number] EXAM:  CT BRAIN   ORDERED BY: KATHRIN JIMENEZ     *** ADDENDUM # 1 ***    Please disregard the CTA dictation below as it was dictated separately.    --- End of Report ---    *** END OF ADDENDUM # 1 ***      PROCEDURE DATE:  02/26/2025          INTERPRETATION:  CLINICAL INFORMATION: ha intermittent dizziness when   turning head hx Parkinsons    ha intermittent diz    COMPARISON: CT of the head performed on 2/1/2024.    CONTRAST:  IV Contrast: NONE  .    Technique: CT head was performed utilizing axial images from the base of   the skull through the vertex without the administration of intravenous   contrast. Images were reviewed in the bone, brain and subdural windows.    Findings:    There is no evidence of acute intracranial hemorrhage or acute   transcortical infarct. Mild patchy areas of low density within the white   matter consistent with minimal microvascular disease. The ventricles are   normal in size and caliber.  There is no evidence of mass-effect or midline shift. There is no   evidence of an intra or extra-axial fluid collection.    Visualized paranasal sinuses and bilateral mastoid air cells are clear.      CT ANGIOGRAM OF THE INTRACRANIAL CAROTID ARTERIES:    Technique: CT angiogram of the intracranialcarotid arteries was   performed was performed utilizing helical slices from the base of the   skull through the vertex during bolus injection of intravenous contrast   material. Overlapping reconstructions were obtained to allow for sagittal   and coronal reformatted images . 3-D images were created from the data   set. Images were also included.      Findings: Evaluation of the anterior circulation demonstrates   circumferential calcification along the cavernous and supraclinoid   internal carotid arteries consistent with atherosclerotic disease. There   is normal course and caliber of the distal internal carotid arteries.   There is a approximately 2.7 mm outpouching that appears to originate   from the junction of the left A1/A2 segment of the GRAEME and anterior   communicating artery that is directed to the right. (Image #[id number] series   6). There is a normal appearance to the bilateral anterior cerebral and   middle cerebral arteries.    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 5 mm with   origin of the right SCA, the origin of the left SCA and the origin of the   left PCA emanating from the basilar tip (image #215 series 6). There is   an approximately 2.5 mm outpouching along the right posterior   supraclinoid internal carotid artery at the takeoff of the right fetal   origin of the PCA that may represent an infundibulum though a small   aneurysm cannot be excluded There is normal courseand caliber of the   bilateral vertebral arteries and vertebrobasilar junction.. There is   fetal origin of the right PCA.    There is no evidence of  stenosis .      CT ANGIOGRAM OF THE EXTRACRANIAL CAROTID ARTERIES:    Technique: CT angiogram of the extracranial carotid arteries was   performed was performed utilizing helical slices from the base of the   skull through the mediastinum during bolus injection of intravenous   contrast material. Overlapping reconstructions were obtained to allow for   sagittal and coronal reformatted images. 3-D reconstruction was also   performed.    Findings: Evaluation of the aortic arch and the origin of the great   vessels are within normal limits. There are mild calcifications along the   aortic arch consistent with atherosclerotic disease.    The course and caliber of the bilateral common carotid, internal carotid   and external carotid arteries are within normal limits. There is no   evidence of focal stenosis or dissection.    The origin of the vertebral arteries are within normal limits without   evidence of stenosis. The course and caliber of the cervical segments of   the vertebral arteries are normal.      There is diffuse osteopenia. There are degenerative changes of the   cervical spine.There is fusion of the bilateral lateral masses of C3 on   C4  IMPRESSION:    CT of the brain:  No acute intracranial injury  The results were discussed with   at 12:05 PM on 10/2/2024. This study   was performed on 10/2/2024 at 11:52 AM    CTA ofthe intracranial circulation.  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  Approximately 5 mm patulous appearance to the distal basilar artery with   the bilateral SCA's and left PCA emanating from this confluence. Cannot   rule outfusiform aneurysm.  Small 2.5 mm outpouching seen along the right posterior supraclinoid   internal carotid artery at the origin of the fetal origin of the PCA;   findings most likely represents an infundibulum though a tiny aneurysm   cannot be excluded.    CTA of the extracranial circulation.  No evidence of carotid stenosis.           Review of Systems : per HPI         Vital Signs Last 24 Hrs  T(C): 36.3 (27 Feb 2025 05:39), Max: 36.9 (26 Feb 2025 19:14)  T(F): 97.4 (27 Feb 2025 05:39), Max: 98.5 (26 Feb 2025 19:14)  HR: 86 (27 Feb 2025 08:00) (60 - 98)  BP: 150/62 (27 Feb 2025 08:00) (109/62 - 154/82)  BP(mean): 101 (26 Feb 2025 19:40) (101 - 101)  RR: 17 (27 Feb 2025 05:39) (16 - 18)  SpO2: 95% (27 Feb 2025 05:39) (90% - 98%)    Parameters below as of 27 Feb 2025 05:39  Patient On (Oxygen Delivery Method): room air            Physical Exam:  84 y o man lying comfortably in semi Figueroa's position , awake , alert , no acute complaints , feels tired     Head: normocephalic , atraumatic    Eyes: PERRLA , EOMI , no nystagmus , sclera anicteric    ENT / FACE: neg nasal discharge , uvula midline , no oropharyngeal erythema / exudate    Neck: supple , negative JVD , negative carotid bruits , no thyromegaly    Chest: CTA bilaterally     Cardiovascular: regular rate and rhythm , neg murmurs / rubs / gallops    Abdomen: soft , non distended , no tenderness to palpation in all 4 quadrants ,  normal bowel sounds     Extremities: WWP , neg cyanosis /clubbing / edema     Neurologic Exam:     Alert and oriented to person , place , date/year , speech fluent w/o dysarthria   Cranial Nerves:           II:                         pupils equal , round and reactive to light , visual fields intact         III/ IV/VI:             extraocular movements intact , neg nystagmus , neg ptosis        V:                        facial sensation intact , V1-3 normal        VII:                      face symmetric , no droop , normal eye closure and smile        VIII:                     hearing intact to finger rub bilaterally        IX and X:             no hoarseness , gag intact , palate/ uvula rise symmetrically        XI:                       SCM / trapezius strength intact bilateral        XII:                      no tongue deviation    Motor Exam:        > 3+/5 x 4 extremities , without drift          Sensation:         intact to light touch x 4 extremities                            no neglect or extinction on double simultaneous testing    DTR:           biceps/brachioradialis: equal                            patella/ankle: equal          neg Babinski      Coordination:            Finger to Nose:  neg dysmetria bilaterally        Gait:  not tested         PM&R Impression: admitted for c/o dizziness     - no acute pathology on CT brain imaging     - deconditioned    - no focal neurologic deficits       Recommendations / Plan:       1) Physical / Occupational therapy focusing on therapeutic exercises , equipment evaluation , bed mobility/transfer out of bed evaluation , progressive ambulation with assistive devices prn .    2) Current disposition plan recommendation:    subacute rehab placement

## 2025-02-27 NOTE — OCCUPATIONAL THERAPY INITIAL EVALUATION ADULT - MD ORDER
Postural dizziness, room spinning  Inability to perform activities of daily living  Vertigo  H/o Parkinson's Disease

## 2025-02-27 NOTE — PROGRESS NOTE ADULT - SUBJECTIVE AND OBJECTIVE BOX
OVERNIGHT EVENTS: NAEO    SUBJECTIVE / INTERVAL HPI: Patient seen and examined at bedside this morning. Pt offers no c/o any sort. Reports dizziness has resolved. Patient denying chest pain, SOB, palpitations, cough.     Remaining ROS negative       PHYSICAL EXAM:  General: NAD, appears comfortable    HEENT:  PERRL, anicteric sclera  Cardiovascular:  RRR  Respiratory: CTA B/L  Gastrointestinal: soft, NT/ND; +BSx4  Extremities: no edema to LE  Vascular: 2+ radial pulses  Neurological: AAOx3; no focal deficits      VITAL SIGNS:  Vital Signs Last 24 Hrs  T(C): 36.4 (27 Feb 2025 09:00), Max: 36.9 (26 Feb 2025 19:14)  T(F): 97.6 (27 Feb 2025 09:00), Max: 98.5 (26 Feb 2025 19:14)  HR: 91 (27 Feb 2025 09:00) (60 - 94)  BP: 136/70 (27 Feb 2025 09:00) (109/62 - 150/62)  BP(mean): 101 (26 Feb 2025 19:40) (101 - 101)  RR: 18 (27 Feb 2025 09:00) (16 - 18)  SpO2: 96% (27 Feb 2025 09:00) (94% - 96%)    Parameters below as of 27 Feb 2025 09:00  Patient On (Oxygen Delivery Method): room air          MEDICATIONS:  MEDICATIONS  (STANDING):  aspirin  chewable 81 milliGRAM(s) Oral every 24 hours  atorvastatin 40 milliGRAM(s) Oral at bedtime  enoxaparin Injectable 40 milliGRAM(s) SubCutaneous every 24 hours    MEDICATIONS  (PRN):  acetaminophen     Tablet .. 650 milliGRAM(s) Oral every 6 hours PRN Temp greater or equal to 38C (100.4F), Mild Pain (1 - 3)  meclizine 12.5 milliGRAM(s) Oral two times a day PRN Dizziness      ALLERGIES:  Allergies    avocado (Anaphylaxis (Mild); Angioedema (Mod to Severe); Headache; Other)  penicillin (Rash)    Intolerances    statins (Muscle Pain)      LABS:                        13.4   7.10  )-----------( 144      ( 26 Feb 2025 11:07 )             38.5     02-26    142  |  104  |  28[H]  ----------------------------<  122[H]  3.9   |  27  |  0.96    Ca    9.7      26 Feb 2025 11:07  Phos  2.5     02-26  Mg     2.5     02-26    TPro  8.1  /  Alb  4.2  /  TBili  0.7  /  DBili  x   /  AST  15  /  ALT  14  /  AlkPhos  88  02-26      Urinalysis Basic - ( 26 Feb 2025 11:07 )    Color: Yellow / Appearance: Clear / SG: >1.030 / pH: x  Gluc: 122 mg/dL / Ketone: Negative mg/dL  / Bili: Negative / Urobili: 1.0 mg/dL   Blood: x / Protein: Negative mg/dL / Nitrite: Negative   Leuk Esterase: Trace / RBC: 4 /HPF / WBC 1 /HPF   Sq Epi: x / Non Sq Epi: 0 /HPF / Bacteria: Negative /HPF      CAPILLARY BLOOD GLUCOSE          RADIOLOGY & ADDITIONAL TESTS: Reviewed.

## 2025-02-28 PROCEDURE — 99232 SBSQ HOSP IP/OBS MODERATE 35: CPT | Mod: GC

## 2025-02-28 RX ORDER — ZINC SULFATE 50(220)MG
220 CAPSULE ORAL EVERY 24 HOURS
Refills: 0 | Status: DISCONTINUED | OUTPATIENT
Start: 2025-03-01 | End: 2025-03-03

## 2025-02-28 RX ORDER — ATORVASTATIN CALCIUM 80 MG/1
20 TABLET, FILM COATED ORAL AT BEDTIME
Refills: 0 | Status: DISCONTINUED | OUTPATIENT
Start: 2025-03-01 | End: 2025-03-03

## 2025-02-28 RX ORDER — B1/B2/B3/B5/B6/B12/VIT C/FOLIC 500-0.5 MG
1 TABLET ORAL EVERY 24 HOURS
Refills: 0 | Status: DISCONTINUED | OUTPATIENT
Start: 2025-03-01 | End: 2025-03-03

## 2025-02-28 RX ADMIN — ENOXAPARIN SODIUM 40 MILLIGRAM(S): 100 INJECTION SUBCUTANEOUS at 07:05

## 2025-02-28 RX ADMIN — Medication 500 MILLIGRAM(S): at 19:22

## 2025-02-28 RX ADMIN — Medication 81 MILLIGRAM(S): at 07:05

## 2025-02-28 RX ADMIN — Medication 2000 UNIT(S): at 14:01

## 2025-02-28 NOTE — DIETITIAN INITIAL EVALUATION ADULT - ORAL INTAKE PTA/DIET HISTORY
avocado allergy confirmed. Pt reported good appetite and PO intake at baseline, denied following any specific diet PTA.

## 2025-02-28 NOTE — DIETITIAN INITIAL EVALUATION ADULT - PROBLEM SELECTOR PLAN 1
Acute on chronic sensation of dizziness and room spinning every time he moved his head and then it resolved on it's on. Reports dizziness when he rotates his head to the Right side, denies dizziness when rotating to the left side. As per chart review, pt has been seen in the ED prior for similar complaint and was instructed to f/u with Neurology outpatient but f/u was lost.  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 No large vessel occlusion   or significant stenosis. Please read radiology report for more info.  NSGY consulted in ED- No acute intervention at this time   Likely BPPV    -Neurology Consult in am for vertigo and parkinsons dx  -Will try Meclizine 12.5mg BID PRN   -F/u Orthostatics- low suspicion as this has been a chronic issue for patient  -Fall precautions

## 2025-02-28 NOTE — DIETITIAN INITIAL EVALUATION ADULT - PROBLEM SELECTOR PLAN 4
Hx of CVA back in 2020, Pt at that time also presented with dizziness, CTH was negative. tPA was administered  for focal neurologic deficits of bilateral dysmetria, slurred speech, and left pronator drift. Monitored in MICU and was discharged on Aspirin 81mg, Plavix 75mg and Meclizine 12.5mg BID  Pt reports he doesn't take those medications any more because of a Nosebleed episode Jan 2021.    -c/w Aspirin 81mg and lipitor   -collateral in am

## 2025-02-28 NOTE — PROGRESS NOTE ADULT - ATTENDING COMMENTS
Patient remains at baseline, denies dizziness, no other complaints reported.   BP acceptable off Amlodipine, continue to monitor  Will follow-up with Parkinson's specialist as outpatient  Refusing Plavix for secondary stroke prevention. On ASA, statins low dose secondary to myalgias  Pending [facility]

## 2025-02-28 NOTE — DIETITIAN INITIAL EVALUATION ADULT - OTHER CALCULATIONS
*Using current body weight as it is within % ideal body weight. Needs adjusted for malnutrition, pressure injury. ideal body weight: 160 pounds; % ideal body weight: 91%

## 2025-02-28 NOTE — DIETITIAN INITIAL EVALUATION ADULT - OTHER INFO
Per H&P: 85 y/o with PMHx of Parkinson's (previously on Carbidopa-Levodopa ), Hx of CVA (2020) with no residual deficits, HTN, HLD, Hammertoe P/W acute on chronic sensation of dizziness and room spinning every time he moved his head and then it resolved on it's on. Pt states he has had this since last year around summer time, has tried meclizine in the past but that hasn't helped him at all. Reports dizziness when he rotates his head to the Right side, denies dizziness when rotating to the left side. As per chart review, pt has been seen in the ED prior for similar complaint and was instructed to f/u with Neurology outpatient but f/u was lost.   Additionally, pt and daughter report that he has been having increasing difficulty with ADL's and mobility. Pt walks with a walker at home but over last 2 months due to generalized weakness pt is having hard time with mobility and balance. Both are in agreement that given his increased weakness and age, pt would like to go to a Nursing Home. Lastly pt reports he hasn't taking his Parkinson's medication for the past 2 month's because it was making him very drowsy and tired. + At this time, Pt denies chest pain, sob, palpitations, abdominal pain, nausea, vomiting, headache, dysuria or diarrhea.     Met with pt this AM at bedside. Pt was seated upright, eating breakfast, and able to articulate his nutrition hx well. avocado allergy confirmed. Pt reported good appetite and PO intake at baseline, denied following any specific diet PTA. Pt reported current appetite and PO intake is good, mentioned he has been eating his meals. Pt denied chewing/swallowing difficulties and nausea/vomiting/constipation, however endorsed diarrhea, stated last BM this AM 2/28. RD encouraged adequate fluid intake in setting of diarrhea to avoid dehydration. Pt reported usual body weight 157 pounds >1 year ago and mentioned he has been stable at his current weight for ~1 year (7.6% wt loss x 1 year). RD reviewed protein sources (chicken, fish, beans, nut, etc.) and encouraged adequate protein consumption in order to improve skin integrity, regain strength, maintain muscle mass and reach adequate nutritional status. Pt was accepting to RD suggestion and asking appropriate questions.

## 2025-02-28 NOTE — DIETITIAN INITIAL EVALUATION ADULT - PERTINENT MEDS FT
MEDICATIONS  (STANDING):  aspirin  chewable 81 milliGRAM(s) Oral every 24 hours  atorvastatin 40 milliGRAM(s) Oral at bedtime  enoxaparin Injectable 40 milliGRAM(s) SubCutaneous every 24 hours    MEDICATIONS  (PRN):  acetaminophen     Tablet .. 650 milliGRAM(s) Oral every 6 hours PRN Temp greater or equal to 38C (100.4F), Mild Pain (1 - 3)  meclizine 25 milliGRAM(s) Oral two times a day PRN Dizziness

## 2025-02-28 NOTE — PROGRESS NOTE ADULT - SUBJECTIVE AND OBJECTIVE BOX
OVERNIGHT EVENTS: NAEO    SUBJECTIVE / INTERVAL HPI: Patient seen and examined at bedside. Pt denies any dizziness. Offers no other c/o any sort. Denies chest pain, palpitations, sob, abdominal pain, headache, or n/v/d.     Remaining ROS negative       PHYSICAL EXAM:  General: NAD, appears comfortable, resting tremor to UE   HEENT: PERRL, anicteric sclera  Cardiovascular:  RRR  Respiratory: CTA B/L  Gastrointestinal: soft, NT/ND; +BSx4  Extremities: no edema to LE  Vascular: 2+ radial pulses  Neurological: AAOx3; no focal deficits    VITAL SIGNS:  Vital Signs Last 24 Hrs  T(C): 36.5 (28 Feb 2025 08:38), Max: 36.8 (27 Feb 2025 20:53)  T(F): 97.7 (28 Feb 2025 08:38), Max: 98.3 (27 Feb 2025 20:53)  HR: 94 (28 Feb 2025 08:38) (79 - 95)  BP: 113/74 (28 Feb 2025 08:38) (113/74 - 133/69)  BP(mean): --  RR: 18 (28 Feb 2025 08:38) (18 - 18)  SpO2: 95% (28 Feb 2025 08:38) (93% - 96%)    Parameters below as of 28 Feb 2025 08:38  Patient On (Oxygen Delivery Method): room air    MEDICATIONS:  MEDICATIONS  (STANDING):  aspirin  chewable 81 milliGRAM(s) Oral every 24 hours  cholecalciferol 2000 Unit(s) Oral every 24 hours  enoxaparin Injectable 40 milliGRAM(s) SubCutaneous every 24 hours    MEDICATIONS  (PRN):  acetaminophen     Tablet .. 650 milliGRAM(s) Oral every 6 hours PRN Temp greater or equal to 38C (100.4F), Mild Pain (1 - 3)  meclizine 25 milliGRAM(s) Oral two times a day PRN Dizziness      ALLERGIES:  Allergies  avocado (Anaphylaxis (Mild); Angioedema (Mod to Severe); Headache; Other)  penicillin (Rash)  Intolerances  statins (Muscle Pain)      LABS:              CAPILLARY BLOOD GLUCOSE          RADIOLOGY & ADDITIONAL TESTS: Reviewed.

## 2025-02-28 NOTE — DIETITIAN INITIAL EVALUATION ADULT - PROBLEM SELECTOR PLAN 3
Previously on Carbidopa-Levodopa 25-100mg 1 tab two times a day  Pt reports he stopped taking his medication two months a because it makes him drowsy and increased dizziness, pt made his PCP aware. overall feels parkinson's symptoms are worse, wants to try other meds     -Please call PCP to confirm that PCP is aware pt has not been taking his medication  -Neuro consult in am   -fall precautions.

## 2025-02-28 NOTE — DIETITIAN INITIAL EVALUATION ADULT - PERTINENT LABORATORY DATA
02-26    142  |  104  |  28[H]  ----------------------------<  122[H]  3.9   |  27  |  0.96    Ca    9.7      26 Feb 2025 11:07  Phos  2.5     02-26  Mg     2.5     02-26    TPro  8.1  /  Alb  4.2  /  TBili  0.7  /  DBili  x   /  AST  15  /  ALT  14  /  AlkPhos  88  02-26

## 2025-02-28 NOTE — DIETITIAN INITIAL EVALUATION ADULT - ADD RECOMMEND
1. Continue with Regular diet  - Encourage adequate PO and protein intake  - Honor food preferences, as medically able  2. Recommend Ensure plus HP BID  - Provides 350 kcal, 20 g pro per serving  3. Recommend micronutrients to assist with wound healing:  - MVI for general nutrient coverage  - Ascorbic acid 500 mg BID  - Zinc sulfate 220 mg/d x 14 days total  4. Appreciate weekly weight trends  5. Continue to monitor BMs and manage medically  - Consider banatrol BID if diarrhea persists  6. Monitor lytes, specifically Mg and Phos, replete prn

## 2025-02-28 NOTE — DIETITIAN INITIAL EVALUATION ADULT - NSFNSGIIOFT_GEN_A_CORE
Pt denied chewing/swallowing difficulties and nausea/vomiting/constipation, however endorsed diarrhea, stated last BM this AM 2/28.

## 2025-03-01 DIAGNOSIS — E43 UNSPECIFIED SEVERE PROTEIN-CALORIE MALNUTRITION: ICD-10-CM

## 2025-03-01 PROCEDURE — 99233 SBSQ HOSP IP/OBS HIGH 50: CPT | Mod: GC

## 2025-03-01 RX ADMIN — Medication 500 MILLIGRAM(S): at 18:37

## 2025-03-01 RX ADMIN — Medication 1 TABLET(S): at 07:09

## 2025-03-01 RX ADMIN — Medication 2000 UNIT(S): at 11:49

## 2025-03-01 RX ADMIN — Medication 220 MILLIGRAM(S): at 08:50

## 2025-03-01 RX ADMIN — ATORVASTATIN CALCIUM 20 MILLIGRAM(S): 80 TABLET, FILM COATED ORAL at 21:53

## 2025-03-01 RX ADMIN — Medication 81 MILLIGRAM(S): at 07:15

## 2025-03-01 RX ADMIN — Medication 500 MILLIGRAM(S): at 07:09

## 2025-03-01 RX ADMIN — ENOXAPARIN SODIUM 40 MILLIGRAM(S): 100 INJECTION SUBCUTANEOUS at 07:09

## 2025-03-01 NOTE — PROGRESS NOTE ADULT - SUBJECTIVE AND OBJECTIVE BOX
Physical Medicine and Rehabilitation Progress Note :       Patient is a 84y old  Male who presents with a chief complaint of Vertigo (01 Mar 2025 06:23)      HPI:  85 y/o with PMHx of Parkinson's (previously on Carbidopa-Levodopa ), Hx of CVA (2020) with no residual deficits, HTN, HLD, Hammertoe P/W acute on chronic sensation of dizziness and room spinning every time he moved his head and then it resolved on it's on. Pt states he has had this since last year around summer time, has tried meclizine in the past but that hasn't helped him at all. Reports dizziness when he rotates his head to the Right side, denies dizziness when rotating to the left side. As per chart review, pt has been seen in the ED prior for similar complaint and was instructed to f/u with Neurology outpatient but f/u was lost.   Additionally, pt and daughter report that he has been having increasing difficulty with ADL's and mobility. Pt walks with a walker at home but over last 2 months due to generalized weakness pt is having hard time with mobility and balance. Both are in agreement that given his increased weakness and age, pt would like to go to a Nursing Home. Lastly pt reports he hasn't taking his Parkinson's medication for the past 2 month's because it was making him very drowsy and tired. + At this time, Pt denies chest pain, sob, palpitations, abdominal pain, nausea, vomiting, headache, dysuria or diarrhea.         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 No large vessel occlusion   or significant stenosis. Please read radiology report for more info.  Interventions: Tylenol 650mg x 1  Consults: NSGY (26 Feb 2025 17:30)                Vital Signs Last 24 Hrs  T(C): 36.7 (01 Mar 2025 09:05), Max: 36.9 (28 Feb 2025 20:45)  T(F): 98.1 (01 Mar 2025 09:05), Max: 98.5 (28 Feb 2025 20:45)  HR: 74 (01 Mar 2025 09:05) (60 - 89)  BP: 124/64 (01 Mar 2025 09:05) (124/64 - 149/75)  BP(mean): --  RR: 16 (01 Mar 2025 09:05) (16 - 18)  SpO2: 97% (01 Mar 2025 09:05) (94% - 97%)    Parameters below as of 01 Mar 2025 09:05  Patient On (Oxygen Delivery Method): room air        MEDICATIONS  (STANDING):  ascorbic acid 500 milliGRAM(s) Oral every 12 hours  aspirin  chewable 81 milliGRAM(s) Oral every 24 hours  atorvastatin 20 milliGRAM(s) Oral at bedtime  cholecalciferol 2000 Unit(s) Oral every 24 hours  enoxaparin Injectable 40 milliGRAM(s) SubCutaneous every 24 hours  multivitamin 1 Tablet(s) Oral every 24 hours  zinc sulfate 220 milliGRAM(s) Oral every 24 hours    MEDICATIONS  (PRN):  acetaminophen     Tablet .. 650 milliGRAM(s) Oral every 6 hours PRN Temp greater or equal to 38C (100.4F), Mild Pain (1 - 3)  meclizine 25 milliGRAM(s) Oral two times a day PRN Dizziness      T(C): 36.7 (03-01-25 @ 09:05), Max: 36.9 (02-28-25 @ 20:45)  HR: 74 (03-01-25 @ 09:05) (60 - 89)  BP: 124/64 (03-01-25 @ 09:05) (124/64 - 149/75)  RR: 16 (03-01-25 @ 09:05) (16 - 18)  SpO2: 97% (03-01-25 @ 09:05) (94% - 97%)    Physical Exam:   84 y o man lying comfortably in semi Figueroa's position , awake , alert , no acute complaints ,     Head: normocephalic , atraumatic    Eyes: PERRLA , EOMI , no nystagmus , sclera anicteric    ENT / FACE: neg nasal discharge , uvula midline , no oropharyngeal erythema / exudate    Neck: supple , negative JVD , negative carotid bruits , no thyromegaly    Chest: CTA bilaterally     Cardiovascular: regular rate and rhythm , neg murmurs / rubs / gallops    Abdomen: soft , non distended , no tenderness to palpation in all 4 quadrants ,  normal bowel sounds     Extremities: WWP , neg cyanosis /clubbing / edema     Neurologic Exam:     Alert and oriented to person , place , date/year , speech fluent w/o dysarthria   Cranial Nerves:           II:                         pupils equal , round and reactive to light , visual fields intact         III/ IV/VI:             extraocular movements intact , neg nystagmus , neg ptosis        V:                        facial sensation intact , V1-3 normal        VII:                      face symmetric , no droop , normal eye closure and smile        VIII:                     hearing intact to finger rub bilaterally        IX and X:             no hoarseness , gag intact , palate/ uvula rise symmetrically        XI:                       SCM / trapezius strength intact bilateral        XII:                      no tongue deviation    Motor Exam:        > 3+/5 x 4 extremities , without drift          Sensation:         intact to light touch x 4 extremities                            no neglect or extinction on double simultaneous testing    DTR:           biceps/brachioradialis: equal                            patella/ankle: equal          neg Babinski      Coordination:            Finger to Nose:  neg dysmetria bilaterally          Initial Functional Status Assessment :       Previous Level of Function:     · Additional Comments	Pt repots living with his cousin in an elevator building with no BELEN. Pt reports using cane or walker for ambulation and has difficulty performing ADLs/ functional mobility, receives help from cousin as needed.    Cognitive Status Examination:   · Orientation	person; place; situation  · Level of Consciousness	alert; confused  · Follows Commands and Answers Questions	100% of the time  · Personal Safety and Judgment	impaired; cues for safety awareness    Range of Motion Exam:   · Range of Motion Examination	bilateral lower extremity ROM was WFL (within functional limits)  · Active Range of Motion Examination	bilateral  lower extremity Active ROM was WFL (within functional limits)    MMT Hip:     · Hip Flexion	(4) good, right  (4-) good minus, left  · Hip ABduction	(4) good, left  (4) good, right  · Hip ADduction	(4) good, left  (4) good, right    MMT Knee:     · Knee Flexion	(4-) good minus, left  (4-) good minus, right  · Knee Extension	(4-) good minus, left  (4-) good minus, right    MMT Ankle:     · Ankle Dorsiflexion	(4) good, left  (4) good, right    Bed Mobility: Rolling/Turning:     · Level of [place]	unable to perform; pt returned and received sitting EOB    Bed Mobility: Scooting/Bridging:     · Level of [place]	unable to perform; pt returned and received sitting EOB    Bed Mobility: Sit to Supine:     · Level of [place]	unable to perform; pt returned and received sitting EOB    Bed Mobility: Supine to Sit:     · Level of [place]	unable to perform; pt returned and received sitting EOB    Transfer: Sit to Stand:     · Level of [place]	minimum assist (75% patients effort)  · Physical Assist/Nonphysical Assist	1 person assist; verbal cues  · Weight-Bearing Restrictions	full weight-bearing  · Assistive Device	rolling walker    Transfer: Stand to Sit:     · Level of [place]	minimum assist (75% patients effort)  · Physical Assist/Nonphysical Assist	1 person assist; verbal cues  · Weight-Bearing Restrictions	full weight-bearing  · Assistive Device	rolling walker    Sit/Stand Transfer Safety Analysis:     · Transfer Safety Concerns Noted	decreased balance during turns; decreased safety awareness; decreased weight-shifting ability; decreased step length; decreased sequencing ability  · Impairments Contributing to Impaired Transfers	impaired balance; impaired coordination; decreased strength    Gait Skills:     · Level of [place]	minimum assist (75% patients effort)  · Physical Assist/Nonphysical Assist	1 person assist; verbal cues  · Weight-Bearing Restrictions	full weight-bearing  · Assistive Device	rolling walker  · Gait Distance	40ft x 2    Gait Analysis:     · Gait Deviations Noted	decreased cash; increased time in double stance; decreased step length; decreased weight-shifting ability; decreased stride length  · Impairments Contributing to Gait Deviations	impaired balance; impaired coordination; decreased strength    Balance Skills Assessment:     · Sitting Balance: Static	good balance  · Sitting Balance: Dynamic	good balance  · Sit-to-Stand Balance	fair balance  · Standing Balance: Static	fair minus  · Standing Balance: Dynamic	poor plus    Treatment Location:   · Comments	Pt with resting and intention tremor in BUE    Clinical Impressions:   · Criteria for Skilled Therapeutic Interventions	impairments found; functional limitations in following categories; risk reduction/prevention; rehab potential; therapy frequency; anticipated discharge recommendation  · Impairments Found (describe specific impairments)	aerobic capacity/endurance; muscle strength; poor safety awareness; gait, locomotion, and balance; gross motor  · Functional Limitations in Following Categories (describe specific limitations)	self-care; home management; community/leisure  · Risk Reduction/Prevention (Describe Specific Areas of risk reduction/prevention)	risk factors  · Risk Areas	fall; impaired judgment; safety awareness          PM&R Impression : as above    Current disposition plan recommendation :    subacute rehab placement

## 2025-03-01 NOTE — PROGRESS NOTE ADULT - SUBJECTIVE AND OBJECTIVE BOX
OVERNIGHT EVENTS: NAEO    SUBJECTIVE / INTERVAL HPI: Patient seen and examined at bedside. Pt denies any dizziness. Offers no other c/o any sort. Denies chest pain, palpitations, sob, abdominal pain, headache, or n/v/d.       Remaining ROS negative       PHYSICAL EXAM:  General: NAD, appears comfortable, resting tremor to UE   HEENT: PERRL, anicteric sclera  Cardiovascular:  RRR  Respiratory: CTA B/L  Gastrointestinal: soft, NT/ND; +BSx4  Extremities: no edema to LE  Vascular: 2+ radial pulses  Neurological: AAOx3; no focal deficits      VITAL SIGNS:  Vital Signs Last 24 Hrs  T(C): 36.7 (01 Mar 2025 15:52), Max: 36.9 (28 Feb 2025 20:45)  T(F): 98.1 (01 Mar 2025 15:52), Max: 98.5 (28 Feb 2025 20:45)  HR: 70 (01 Mar 2025 15:52) (60 - 84)  BP: 127/64 (01 Mar 2025 15:52) (124/64 - 136/72)  BP(mean): --  RR: 19 (01 Mar 2025 15:52) (16 - 19)  SpO2: 96% (01 Mar 2025 15:52) (95% - 97%)    Parameters below as of 01 Mar 2025 15:52  Patient On (Oxygen Delivery Method): room air          MEDICATIONS:  MEDICATIONS  (STANDING):  ascorbic acid 500 milliGRAM(s) Oral every 12 hours  aspirin  chewable 81 milliGRAM(s) Oral every 24 hours  atorvastatin 20 milliGRAM(s) Oral at bedtime  cholecalciferol 2000 Unit(s) Oral every 24 hours  enoxaparin Injectable 40 milliGRAM(s) SubCutaneous every 24 hours  multivitamin 1 Tablet(s) Oral every 24 hours  zinc sulfate 220 milliGRAM(s) Oral every 24 hours    MEDICATIONS  (PRN):  acetaminophen     Tablet .. 650 milliGRAM(s) Oral every 6 hours PRN Temp greater or equal to 38C (100.4F), Mild Pain (1 - 3)  meclizine 25 milliGRAM(s) Oral two times a day PRN Dizziness      ALLERGIES:  Allergies  avocado (Anaphylaxis (Mild); Angioedema (Mod to Severe); Headache; Other)  penicillin (Rash)  Intolerances  statins (Muscle Pain)      LABS:              CAPILLARY BLOOD GLUCOSE          RADIOLOGY & ADDITIONAL TESTS: Reviewed.

## 2025-03-01 NOTE — PROGRESS NOTE ADULT - ATTENDING COMMENTS
85 y/o with PMHx of Parkinson's (previously on Carbidopa-Levodopa ), CVA (2020), HTN, HLD, Hammertoe P/W acute on chronic sensation of dizziness and room spinning every time he moved his head, admitted for management and also for placement to a Nursing home. Symptoms now resolved.    no labs done this morning, plan to recheck tomorrow    Patient stable, at baseline.   symptoms improved, c/w meclizine  PT/OT  Parkinsons: c/w carbidopa/levodopa  c/w aspirin/lipitor  Patient no longer on plavix due to r/b discussion 2/2 repeat epistaxis  HTN, restart norvasc as needed  brain aneurysm, nsg outpt FU  Pending [facility] placement, likely after the weekend

## 2025-03-02 LAB
ANION GAP SERPL CALC-SCNC: 7 MMOL/L — SIGNIFICANT CHANGE UP (ref 5–17)
BASOPHILS # BLD AUTO: 0.04 K/UL — SIGNIFICANT CHANGE UP (ref 0–0.2)
BASOPHILS NFR BLD AUTO: 0.6 % — SIGNIFICANT CHANGE UP (ref 0–2)
BUN SERPL-MCNC: 25 MG/DL — HIGH (ref 7–23)
CALCIUM SERPL-MCNC: 9.2 MG/DL — SIGNIFICANT CHANGE UP (ref 8.4–10.5)
CHLORIDE SERPL-SCNC: 105 MMOL/L — SIGNIFICANT CHANGE UP (ref 96–108)
CO2 SERPL-SCNC: 28 MMOL/L — SIGNIFICANT CHANGE UP (ref 22–31)
CREAT SERPL-MCNC: 0.77 MG/DL — SIGNIFICANT CHANGE UP (ref 0.5–1.3)
EGFR: 87 ML/MIN/1.73M2 — SIGNIFICANT CHANGE UP
EGFR: 87 ML/MIN/1.73M2 — SIGNIFICANT CHANGE UP
EOSINOPHIL # BLD AUTO: 0.23 K/UL — SIGNIFICANT CHANGE UP (ref 0–0.5)
EOSINOPHIL NFR BLD AUTO: 3.3 % — SIGNIFICANT CHANGE UP (ref 0–6)
GLUCOSE SERPL-MCNC: 94 MG/DL — SIGNIFICANT CHANGE UP (ref 70–99)
HCT VFR BLD CALC: 39.2 % — SIGNIFICANT CHANGE UP (ref 39–50)
HGB BLD-MCNC: 13.7 G/DL — SIGNIFICANT CHANGE UP (ref 13–17)
IMM GRANULOCYTES NFR BLD AUTO: 0.3 % — SIGNIFICANT CHANGE UP (ref 0–0.9)
LYMPHOCYTES # BLD AUTO: 1.76 K/UL — SIGNIFICANT CHANGE UP (ref 1–3.3)
LYMPHOCYTES # BLD AUTO: 25 % — SIGNIFICANT CHANGE UP (ref 13–44)
MAGNESIUM SERPL-MCNC: 2.2 MG/DL — SIGNIFICANT CHANGE UP (ref 1.6–2.6)
MCHC RBC-ENTMCNC: 32.3 PG — SIGNIFICANT CHANGE UP (ref 27–34)
MCHC RBC-ENTMCNC: 34.9 G/DL — SIGNIFICANT CHANGE UP (ref 32–36)
MCV RBC AUTO: 92.5 FL — SIGNIFICANT CHANGE UP (ref 80–100)
MONOCYTES # BLD AUTO: 0.51 K/UL — SIGNIFICANT CHANGE UP (ref 0–0.9)
MONOCYTES NFR BLD AUTO: 7.3 % — SIGNIFICANT CHANGE UP (ref 2–14)
NEUTROPHILS # BLD AUTO: 4.47 K/UL — SIGNIFICANT CHANGE UP (ref 1.8–7.4)
NEUTROPHILS NFR BLD AUTO: 63.5 % — SIGNIFICANT CHANGE UP (ref 43–77)
NRBC BLD AUTO-RTO: 0 /100 WBCS — SIGNIFICANT CHANGE UP (ref 0–0)
PHOSPHATE SERPL-MCNC: 3.4 MG/DL — SIGNIFICANT CHANGE UP (ref 2.5–4.5)
PLATELET # BLD AUTO: 139 K/UL — LOW (ref 150–400)
POTASSIUM SERPL-MCNC: 3.6 MMOL/L — SIGNIFICANT CHANGE UP (ref 3.5–5.3)
POTASSIUM SERPL-SCNC: 3.6 MMOL/L — SIGNIFICANT CHANGE UP (ref 3.5–5.3)
RBC # BLD: 4.24 M/UL — SIGNIFICANT CHANGE UP (ref 4.2–5.8)
RBC # FLD: 12.6 % — SIGNIFICANT CHANGE UP (ref 10.3–14.5)
SODIUM SERPL-SCNC: 140 MMOL/L — SIGNIFICANT CHANGE UP (ref 135–145)
WBC # BLD: 7.03 K/UL — SIGNIFICANT CHANGE UP (ref 3.8–10.5)
WBC # FLD AUTO: 7.03 K/UL — SIGNIFICANT CHANGE UP (ref 3.8–10.5)

## 2025-03-02 PROCEDURE — 99232 SBSQ HOSP IP/OBS MODERATE 35: CPT

## 2025-03-02 RX ADMIN — ATORVASTATIN CALCIUM 20 MILLIGRAM(S): 80 TABLET, FILM COATED ORAL at 22:24

## 2025-03-02 RX ADMIN — Medication 500 MILLIGRAM(S): at 18:04

## 2025-03-02 RX ADMIN — Medication 500 MILLIGRAM(S): at 07:10

## 2025-03-02 RX ADMIN — Medication 1 TABLET(S): at 07:09

## 2025-03-02 RX ADMIN — ENOXAPARIN SODIUM 40 MILLIGRAM(S): 100 INJECTION SUBCUTANEOUS at 07:09

## 2025-03-02 RX ADMIN — Medication 2000 UNIT(S): at 11:15

## 2025-03-02 RX ADMIN — Medication 81 MILLIGRAM(S): at 07:10

## 2025-03-02 RX ADMIN — Medication 220 MILLIGRAM(S): at 09:23

## 2025-03-02 NOTE — PROGRESS NOTE ADULT - SUBJECTIVE AND OBJECTIVE BOX
Medicine Progress Note    Patient is a 84y old  Male who presents with a chief complaint of Vertigo (01 Mar 2025 09:52)      SUBJECTIVE / OVERNIGHT EVENTS:  doing well overnight  no dizziness      MEDICATIONS  (STANDING):  ascorbic acid 500 milliGRAM(s) Oral every 12 hours  aspirin  chewable 81 milliGRAM(s) Oral every 24 hours  atorvastatin 20 milliGRAM(s) Oral at bedtime  cholecalciferol 2000 Unit(s) Oral every 24 hours  enoxaparin Injectable 40 milliGRAM(s) SubCutaneous every 24 hours  multivitamin 1 Tablet(s) Oral every 24 hours  zinc sulfate 220 milliGRAM(s) Oral every 24 hours    MEDICATIONS  (PRN):  acetaminophen     Tablet .. 650 milliGRAM(s) Oral every 6 hours PRN Temp greater or equal to 38C (100.4F), Mild Pain (1 - 3)  meclizine 25 milliGRAM(s) Oral two times a day PRN Dizziness    CAPILLARY BLOOD GLUCOSE        I&O's Summary      PHYSICAL EXAM:  Vital Signs Last 24 Hrs  T(C): 36.8 (02 Mar 2025 09:18), Max: 36.8 (02 Mar 2025 09:18)  T(F): 98.2 (02 Mar 2025 09:18), Max: 98.2 (02 Mar 2025 09:18)  HR: 65 (02 Mar 2025 09:18) (60 - 82)  BP: 124/73 (02 Mar 2025 09:18) (123/61 - 134/72)  BP(mean): --  RR: 16 (02 Mar 2025 09:18) (16 - 19)  SpO2: 96% (02 Mar 2025 09:18) (95% - 97%)    Parameters below as of 02 Mar 2025 09:18  Patient On (Oxygen Delivery Method): room air    General: NAD, appears comfortable, resting tremor to UE   HEENT: PERRL, anicteric sclera  Cardiovascular:  RRR  Respiratory: CTA B/L  Gastrointestinal: soft, NT/ND; +BSx4  Extremities: no edema to LE  Vascular: 2+ radial pulses  Neurological: AAOx3; no focal deficits    LABS:                        13.7   7.03  )-----------( 139      ( 02 Mar 2025 05:30 )             39.2     03-02    140  |  105  |  25[H]  ----------------------------<  94  3.6   |  28  |  0.77    Ca    9.2      02 Mar 2025 05:30  Phos  3.4     03-02  Mg     2.2     03-02    Urinalysis Basic - ( 02 Mar 2025 05:30 )    Color: x / Appearance: x / SG: x / pH: x  Gluc: 94 mg/dL / Ketone: x  / Bili: x / Urobili: x   Blood: x / Protein: x / Nitrite: x   Leuk Esterase: x / RBC: x / WBC x   Sq Epi: x / Non Sq Epi: x / Bacteria: x            RADIOLOGY & ADDITIONAL TESTS:  Imaging from Last 24 Hours:    Electrocardiogram/QTc Interval:    COORDINATION OF CARE:  Care Discussed with Consultants/Other Providers:

## 2025-03-03 ENCOUNTER — TRANSCRIPTION ENCOUNTER (OUTPATIENT)
Age: 85
End: 2025-03-03

## 2025-03-03 VITALS
HEART RATE: 68 BPM | TEMPERATURE: 98 F | SYSTOLIC BLOOD PRESSURE: 139 MMHG | DIASTOLIC BLOOD PRESSURE: 86 MMHG | OXYGEN SATURATION: 98 % | RESPIRATION RATE: 18 BRPM

## 2025-03-03 PROCEDURE — 82553 CREATINE MB FRACTION: CPT

## 2025-03-03 PROCEDURE — 71045 X-RAY EXAM CHEST 1 VIEW: CPT

## 2025-03-03 PROCEDURE — 84484 ASSAY OF TROPONIN QUANT: CPT

## 2025-03-03 PROCEDURE — 81001 URINALYSIS AUTO W/SCOPE: CPT

## 2025-03-03 PROCEDURE — 99285 EMERGENCY DEPT VISIT HI MDM: CPT

## 2025-03-03 PROCEDURE — 93005 ELECTROCARDIOGRAM TRACING: CPT

## 2025-03-03 PROCEDURE — 97165 OT EVAL LOW COMPLEX 30 MIN: CPT

## 2025-03-03 PROCEDURE — 99239 HOSP IP/OBS DSCHRG MGMT >30: CPT | Mod: GC

## 2025-03-03 PROCEDURE — 83735 ASSAY OF MAGNESIUM: CPT

## 2025-03-03 PROCEDURE — 36415 COLL VENOUS BLD VENIPUNCTURE: CPT

## 2025-03-03 PROCEDURE — 70496 CT ANGIOGRAPHY HEAD: CPT | Mod: MC

## 2025-03-03 PROCEDURE — 84443 ASSAY THYROID STIM HORMONE: CPT

## 2025-03-03 PROCEDURE — 84100 ASSAY OF PHOSPHORUS: CPT

## 2025-03-03 PROCEDURE — 85025 COMPLETE CBC W/AUTO DIFF WBC: CPT

## 2025-03-03 PROCEDURE — 82550 ASSAY OF CK (CPK): CPT

## 2025-03-03 PROCEDURE — 97116 GAIT TRAINING THERAPY: CPT

## 2025-03-03 PROCEDURE — 87637 SARSCOV2&INF A&B&RSV AMP PRB: CPT

## 2025-03-03 PROCEDURE — 80053 COMPREHEN METABOLIC PANEL: CPT

## 2025-03-03 PROCEDURE — 80048 BASIC METABOLIC PNL TOTAL CA: CPT

## 2025-03-03 PROCEDURE — 97161 PT EVAL LOW COMPLEX 20 MIN: CPT

## 2025-03-03 PROCEDURE — 96374 THER/PROPH/DIAG INJ IV PUSH: CPT

## 2025-03-03 PROCEDURE — 70450 CT HEAD/BRAIN W/O DYE: CPT | Mod: MC

## 2025-03-03 PROCEDURE — 70498 CT ANGIOGRAPHY NECK: CPT | Mod: MC

## 2025-03-03 RX ORDER — MECLIZINE HCL 12.5 MG
1 TABLET ORAL
Qty: 0 | Refills: 0 | DISCHARGE
Start: 2025-03-03

## 2025-03-03 RX ORDER — ZINC SULFATE 50(220)MG
1 CAPSULE ORAL
Qty: 0 | Refills: 0 | DISCHARGE
Start: 2025-03-03

## 2025-03-03 RX ADMIN — Medication 81 MILLIGRAM(S): at 07:06

## 2025-03-03 RX ADMIN — Medication 2000 UNIT(S): at 09:42

## 2025-03-03 RX ADMIN — Medication 1 TABLET(S): at 07:07

## 2025-03-03 RX ADMIN — ENOXAPARIN SODIUM 40 MILLIGRAM(S): 100 INJECTION SUBCUTANEOUS at 07:06

## 2025-03-03 RX ADMIN — Medication 220 MILLIGRAM(S): at 09:41

## 2025-03-03 RX ADMIN — Medication 500 MILLIGRAM(S): at 07:07

## 2025-03-03 NOTE — PROGRESS NOTE ADULT - PROBLEM SELECTOR PROBLEM 9
Spoke with patient -   Cough/congestion started Magdalena 15 - continues with deep congested cough. Has been taking advil and cough med. At home Covid tests has been negative. Colonoscopy for this Wednesday - June 22 has been cancelled.  tested positive for Covid last weekend.     Please advise
Prophylactic measure
Prophylactic measure

## 2025-03-03 NOTE — PROGRESS NOTE ADULT - PROBLEM SELECTOR PROBLEM 4
Parkinsons disease
Parkinsons disease
H/O: CVA (cerebrovascular accident)
H/O: CVA (cerebrovascular accident)

## 2025-03-03 NOTE — PROGRESS NOTE ADULT - PROBLEM SELECTOR PLAN 7
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     -No intervention per NSG   -Outpt f/u
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     -No intervention per NSG   -Outpt f/u
Home med: Lipitor 10mg QHS    -C/w Lipitor 40mg
Home med: Lipitor 10mg QHS    -C/w Lipitor 40mg

## 2025-03-03 NOTE — PROGRESS NOTE ADULT - PROBLEM SELECTOR PLAN 1
IMPROVED   Acute on chronic sensation of dizziness and room spinning every time he moved his head and then it resolved on it's on. Reports dizziness when he rotates his head to the Right side, denies dizziness when rotating to the left side. As per chart review, pt has been seen in the ED prior for similar complaint and was instructed to f/u with Neurology outpatient but f/u was lost.  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 No large vessel occlusion   or significant stenosis. Please read radiology report for more info.  NSGY consulted in ED- No acute intervention at this time   No s/s of Cerebral stroke   Orthostatics +  Likely BPPV    -C/w Meclizine 25mg BID PRN   -Fall precautions

## 2025-03-03 NOTE — PROGRESS NOTE ADULT - PROBLEM SELECTOR PLAN 5
Hx of CVA back in 2020, Pt at that time also presented with dizziness, CTH was negative. tPA was administered  for focal neurologic deficits of bilateral dysmetria, slurred speech, and left pronator drift. Monitored in MICU and was discharged on Aspirin 81mg, Plavix 75mg and Meclizine 12.5mg BID  Pt reports he doesn't take those medications any more because of a Nosebleed episode Jan 2021.    -C/w Aspirin 81mg and Lipitor 40mg   -Pt refused Plavix 75mg because of Epistaxis episode in Jan 2021
Hx of CVA back in 2020, Pt at that time also presented with dizziness, CTH was negative. tPA was administered  for focal neurologic deficits of bilateral dysmetria, slurred speech, and left pronator drift. Monitored in MICU and was discharged on Aspirin 81mg, Plavix 75mg and Meclizine 12.5mg BID  Pt reports he doesn't take those medications any more because of a Nosebleed episode Jan 2021.    -C/w Aspirin 81mg and Lipitor 40mg   -Pt refused Plavix 75mg because of Epistaxis episode in Jan 2021
Home med: Norvasc 10mg QD    -Holding home meds Norvasc for now
Home med: Norvasc 10mg QD    -Holding home meds Norvasc for now

## 2025-03-03 NOTE — DISCHARGE NOTE PROVIDER - NSDCCPCAREPLAN_GEN_ALL_CORE_FT
PRINCIPAL DISCHARGE DIAGNOSIS  Diagnosis: Dizziness  Assessment and Plan of Treatment: Vertigo  ImageVertigo means that you feel like you are moving when you are not. Vertigo can also make you feel like things around you are moving when they are not. This feeling can come and go at any time. Vertigo often goes away on its own.  Follow these instructions at home:  Avoid making fast movements.  Avoid driving.  Avoid using heavy machinery.  Avoid doing any task or activity that might cause danger to you or other people if you would have a vertigo attack while you are doing it.  Sit down right away if you feel dizzy or have trouble with your balance.  Take over-the-counter and prescription medicines only as told by your doctor.  Follow instructions from your doctor about which positions or movements you should avoid.  Drink enough fluid to keep your pee (urine) clear or pale yellow.  Keep all follow-up visits as told by your doctor. This is important.  Contact a doctor if:  Medicine does not help your vertigo.  You have a fever.  Your problems get worse or you have new symptoms.  Your family or friends see changes in your behavior.  You feel sick to your stomach (nauseous) or you throw up (vomit).  You have a “pins and needles” feeling or you are numb in part of your body.  Get help right away if:  You have trouble moving or talking.  You are always dizzy.  You pass out (faint).  You get very bad headaches.  You feel weak or have trouble using your hands, arms, or legs.  You have changes in your hearing.  You have changes in your seeing (vision).  You get a stiff neck.  Bright light starts to bother you.  This information is not intended to replace advice given to you by your health care provider. Make sure you discuss any questions you have with your health care provider.  ---------------------  -Please f/u with your PCP in 10 days  -C/w Meclizine 25mg BID PRN      SECONDARY DISCHARGE DIAGNOSES  Diagnosis: Brain aneurysm  Assessment and Plan of Treatment: A brain aneurysm ([id number]) — also known as a cerebral aneurysm or intracranial aneurysm — is a bulge or ballooning in a blood vessel in the brain. An aneurysm often looks like a berry hanging on a stem.  Experts think brain aneurysms form and grow because blood flowing through the blood vessel puts pressure on a weak area of the vessel wall. This can increase the size of the brain aneurysm. If the brain aneurysm leaks or ruptures, it causes bleeding in the brain, known as a hemorrhagic stroke.  Most often, a ruptured brain aneurysm occurs in the space between the brain and the thin tissues covering the brain. This type of hemorrhagic stroke is called a subarachnoid hemorrhage.  Brain aneurysms are common. But most brain aneurysms aren't serious, especially if they're small. Most brain aneurysms don't rupture. They usually don't cause symptoms or cause health problems. In many cases, brain aneurysms are found during tests for other conditions.  However, a ruptured aneurysm quickly becomes life-threatening and requires medical treatment right away.  If a brain aneurysm hasn't ruptured, treatment may be appropriate in some cases. Treatment of an unruptured brain aneurysm may prevent a rupture in the future. Talk with your health care provider to make sure you understand the best options for your specific needs.  ----------------------------------------------------------------------------  You were found to have an aneursym on CT scan when you came into the hospital. Aneursym is an outpouching of a blood vessel. Neurosurgrey was consulted and they recommended to f/u outpatient.   -Outpatient follow up with Dr. Sergio Borden within 2 weeks    Diagnosis: Severe protein-calorie malnutrition  Assessment and Plan of Treatment: -C/w Ensure BID  -C/w Multi-Vitamin Daily  -C/w Zinc  220mg QD x 14 days (2/28-3/14)  -C/w Vit C 500mg Q12hr    Diagnosis: Parkinsons disease  Assessment and Plan of Treatment: You are not on medication for Parkisnons at this time.   Please f/u with your PCP within 10 days.

## 2025-03-03 NOTE — PROGRESS NOTE ADULT - PROBLEM SELECTOR PLAN 9
F: None  E: Replete PRN  D: Regular  DVT: Lovenox   GI PPX: None  CODE: Full  Dispo: [facility]
F: None  E: Replete PRN  D: Regular  DVT: Lovenox   GI PPX: None  CODE: Full  Dispo: [facility]

## 2025-03-03 NOTE — PROGRESS NOTE ADULT - PROBLEM SELECTOR PROBLEM 3
Severe protein-calorie malnutrition
Parkinsons disease
Severe protein-calorie malnutrition
Parkinsons disease

## 2025-03-03 NOTE — PROGRESS NOTE ADULT - SUBJECTIVE AND OBJECTIVE BOX
Physical Medicine and Rehabilitation Progress Note :       Patient is a 84y old  Male who presents with a chief complaint of Vertigo (03 Mar 2025 11:15)      HPI:  83 y/o with PMHx of Parkinson's (previously on Carbidopa-Levodopa ), Hx of CVA (2020) with no residual deficits, HTN, HLD, Hammertoe P/W acute on chronic sensation of dizziness and room spinning every time he moved his head and then it resolved on it's on. Pt states he has had this since last year around summer time, has tried meclizine in the past but that hasn't helped him at all. Reports dizziness when he rotates his head to the Right side, denies dizziness when rotating to the left side. As per chart review, pt has been seen in the ED prior for similar complaint and was instructed to f/u with Neurology outpatient but f/u was lost.   Additionally, pt and daughter report that he has been having increasing difficulty with ADL's and mobility. Pt walks with a walker at home but over last 2 months due to generalized weakness pt is having hard time with mobility and balance. Both are in agreement that given his increased weakness and age, pt would like to go to a Nursing Home. Lastly pt reports he hasn't taking his Parkinson's medication for the past 2 month's because it was making him very drowsy and tired. + At this time, Pt denies chest pain, sob, palpitations, abdominal pain, nausea, vomiting, headache, dysuria or diarrhea.         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 No large vessel occlusion   or significant stenosis. Please read radiology report for more info.  Interventions: Tylenol 650mg x 1  Consults: NSGY (26 Feb 2025 17:30)                            13.7   7.03  )-----------( 139      ( 02 Mar 2025 05:30 )             39.2       03-02    140  |  105  |  25[H]  ----------------------------<  94  3.6   |  28  |  0.77    Ca    9.2      02 Mar 2025 05:30  Phos  3.4     03-02  Mg     2.2     03-02      Vital Signs Last 24 Hrs  T(C): 36.6 (03 Mar 2025 09:43), Max: 37 (02 Mar 2025 22:00)  T(F): 97.8 (03 Mar 2025 09:43), Max: 98.6 (02 Mar 2025 22:00)  HR: 68 (03 Mar 2025 09:43) (64 - 86)  BP: 139/86 (03 Mar 2025 09:43) (136/74 - 144/80)  BP(mean): --  RR: 18 (03 Mar 2025 09:43) (17 - 18)  SpO2: 98% (03 Mar 2025 09:43) (95% - 98%)    Parameters below as of 03 Mar 2025 09:43  Patient On (Oxygen Delivery Method): room air        MEDICATIONS  (STANDING):  ascorbic acid 500 milliGRAM(s) Oral every 12 hours  aspirin  chewable 81 milliGRAM(s) Oral every 24 hours  atorvastatin 20 milliGRAM(s) Oral at bedtime  cholecalciferol 2000 Unit(s) Oral every 24 hours  enoxaparin Injectable 40 milliGRAM(s) SubCutaneous every 24 hours  multivitamin 1 Tablet(s) Oral every 24 hours  zinc sulfate 220 milliGRAM(s) Oral every 24 hours    MEDICATIONS  (PRN):  acetaminophen     Tablet .. 650 milliGRAM(s) Oral every 6 hours PRN Temp greater or equal to 38C (100.4F), Mild Pain (1 - 3)  meclizine 25 milliGRAM(s) Oral two times a day PRN Dizziness      T(C): 36.6 (03-03-25 @ 09:43), Max: 37 (03-02-25 @ 22:00)  HR: 68 (03-03-25 @ 09:43) (64 - 86)  BP: 139/86 (03-03-25 @ 09:43) (136/74 - 144/80)  RR: 18 (03-03-25 @ 09:43) (17 - 18)  SpO2: 98% (03-03-25 @ 09:43) (95% - 98%)    Physical Exam:    84 y o man lying comfortably in semi Figueroa's position , awake , alert , no acute complaints ,     Head: normocephalic , atraumatic    Eyes: PERRLA , EOMI , no nystagmus , sclera anicteric    ENT / FACE: neg nasal discharge , uvula midline , no oropharyngeal erythema / exudate    Neck: supple , negative JVD , negative carotid bruits , no thyromegaly    Chest: CTA bilaterally     Cardiovascular: regular rate and rhythm , neg murmurs / rubs / gallops    Abdomen: soft , non distended , no tenderness to palpation in all 4 quadrants ,  normal bowel sounds     Extremities: WWP , neg cyanosis /clubbing / edema     Neurologic Exam:     Alert and oriented to person , place , date/year , speech fluent w/o dysarthria   Cranial Nerves:           II:                         pupils equal , round and reactive to light , visual fields intact         III/ IV/VI:             extraocular movements intact , neg nystagmus , neg ptosis        V:                        facial sensation intact , V1-3 normal        VII:                      face symmetric , no droop , normal eye closure and smile        VIII:                     hearing intact to finger rub bilaterally        IX and X:             no hoarseness , gag intact , palate/ uvula rise symmetrically        XI:                       SCM / trapezius strength intact bilateral        XII:                      no tongue deviation    Motor Exam:        > 3+/5 x 4 extremities , without drift          Sensation:         intact to light touch x 4 extremities                            no neglect or extinction on double simultaneous testing    DTR:           biceps/brachioradialis: equal                            patella/ankle: equal          neg Babinski      Coordination:            Finger to Nose:  neg dysmetria bilaterally        Functional Status Assessment :       Pain Assessment/Number Scale (0-10) Adult  Presence of Pain: denies pain/discomfort (Rating = 0)  Pain Rating (0-10): Rest: 0 (no pain/absence of nonverbal indicators of pain)  Pain Rating (0-10): Activity: 0 (no pain/absence of nonverbal indicators of pain)    Safety      AM-PAC Functional Assessment: Basic Mobility  Type of Assessment: Daily assessment  Turning from your back to your side while in a flat bed without using bedrails?: 3 = A little assistance  Moving from lying on your back to sitting on the flat side of a flat bed without using bedrails?: 3 = A little assistance  Moving to and from a bed to a chair (including a wheelchair)?: 3 = A little assistance  Standing up from a chair using your arms (e.g. wheelchair or bedside chair)?: 3 = A little assistance  Walking in hospital room?: 3 = A little assistance  Climbing 3-5 steps with a railing?: 2 = A lot of assistance  Score: 17   Row Comment: Ask the patient "How much help from another person do you currently need? (If the patient hasn't done an activity recently, how much help from another person do you think he/she needs if he/she tried?)    Cognitive/Neuro      Cognitive/Neuro/Behavioral  Cognitive/Neuro/Behavioral [WDL Definition: Alert; opens eyes spontaneously; arouses to voice or touch; oriented x 4; follows commands; speech spontaneous, logical; purposeful motor response; behavior appropriate to situation]: WDL    Language Assistance  Preferred Language to Address Healthcare Preferred Language to Address Healthcare: English    Therapeutic Interventions      Bed Mobility  Bed Mobility Training Rehab Potential: good, to achieve stated therapy goals  Bed Mobility Training Sit-to-Supine: minimum assist (75% patient effort);  1 person assist;  nonverbal cues (demo/gestures);  verbal cues;  LE assist  Bed Mobility Training Limitations: decreased ability to use legs for bridging/pushing;  decreased strength;  impaired balance    Sit-Stand Transfer Training  Sit-to-Stand Transfer Training Rehab Potential: good, to achieve stated therapy goals  Transfer Training Sit-to-Stand Transfer: minimum assist (75% patient effort);  1 person assist;  nonverbal cues (demo/gestures);  verbal cues;  full weight-bearing   straight cane  Transfer Training Stand-to-Sit Transfer: minimum assist (75% patient effort);  1 person assist;  nonverbal cues (demo/gestures);  verbal cues;  full weight-bearing   straight cane  Sit-to-Stand Transfer Training Transfer Safety Analysis: decreased weight-shifting ability;  decreased flexibility;  decreased strength;  impaired balance;  impaired postural control;  straight cane    Gait Training  Gait Training Rehab Potential: good, to achieve stated therapy goals  Gait Training: minimum assist (75% patient effort);  1 person assist;  nonverbal cues (demo/gestures);  verbal cues;  full weight-bearing   straight cane;  40'x2  Gait Analysis: decreased cash;  increased time in double stance;  shuffling;  decreased step length;  decreased toe clearance;  decreased flexibility;  decreased strength;  impaired balance;  impaired postural control;  decreased activity tolerance ;  40'x2;  straight cane    Therapeutic Exercise  Therapeutic Exercise Detail: pt performed seated LAQs x10, STS reps x4, and standing marching to prepare for further mobility             PM&R Impression : as above    Current disposition plan recommendation :    subacute rehab placement

## 2025-03-03 NOTE — PROGRESS NOTE ADULT - SUBJECTIVE AND OBJECTIVE BOX
***Note in progress***    OVERNIGHT EVENTS: NAEO    SUBJECTIVE / INTERVAL HPI: Patient seen and examined at bedside. Patient denying chest pain, SOB, palpitations, cough.     Remaining ROS negative       PHYSICAL EXAM:  General:NAD, appears comfortable    HEENT:  PERRL, anicteric sclera  Cardiovascular:  RRR  Respiratory: CTA B/L  Gastrointestinal: soft, NT/ND; +BSx4  Extremities: no edema to LE  Vascular: 2+ radial pulses  Neurological: AAOx3; no focal deficits  Psychiatric: pleasant mood and affect  Dermatologic: no appreciable wounds or damage to the skin    VITAL SIGNS:  Vital Signs Last 24 Hrs  T(C): 36.6 (03 Mar 2025 09:43), Max: 37 (02 Mar 2025 22:00)  T(F): 97.8 (03 Mar 2025 09:43), Max: 98.6 (02 Mar 2025 22:00)  HR: 68 (03 Mar 2025 09:43) (64 - 86)  BP: 139/86 (03 Mar 2025 09:43) (136/74 - 144/80)  BP(mean): --  RR: 18 (03 Mar 2025 09:43) (17 - 18)  SpO2: 98% (03 Mar 2025 09:43) (95% - 98%)    Parameters below as of 03 Mar 2025 09:43  Patient On (Oxygen Delivery Method): room air          MEDICATIONS:  MEDICATIONS  (STANDING):  ascorbic acid 500 milliGRAM(s) Oral every 12 hours  aspirin  chewable 81 milliGRAM(s) Oral every 24 hours  atorvastatin 20 milliGRAM(s) Oral at bedtime  cholecalciferol 2000 Unit(s) Oral every 24 hours  enoxaparin Injectable 40 milliGRAM(s) SubCutaneous every 24 hours  multivitamin 1 Tablet(s) Oral every 24 hours  zinc sulfate 220 milliGRAM(s) Oral every 24 hours    MEDICATIONS  (PRN):  acetaminophen     Tablet .. 650 milliGRAM(s) Oral every 6 hours PRN Temp greater or equal to 38C (100.4F), Mild Pain (1 - 3)  meclizine 25 milliGRAM(s) Oral two times a day PRN Dizziness      ALLERGIES:  Allergies    avocado (Anaphylaxis (Mild); Angioedema (Mod to Severe); Headache; Other)  penicillin (Rash)    Intolerances    statins (Muscle Pain)      LABS:                        13.7   7.03  )-----------( 139      ( 02 Mar 2025 05:30 )             39.2     03-02    140  |  105  |  25[H]  ----------------------------<  94  3.6   |  28  |  0.77    Ca    9.2      02 Mar 2025 05:30  Phos  3.4     03-02  Mg     2.2     03-02        Urinalysis Basic - ( 02 Mar 2025 05:30 )    Color: x / Appearance: x / SG: x / pH: x  Gluc: 94 mg/dL / Ketone: x  / Bili: x / Urobili: x   Blood: x / Protein: x / Nitrite: x   Leuk Esterase: x / RBC: x / WBC x   Sq Epi: x / Non Sq Epi: x / Bacteria: x      CAPILLARY BLOOD GLUCOSE          RADIOLOGY & ADDITIONAL TESTS: Reviewed.   OVERNIGHT EVENTS: NAEO    SUBJECTIVE / INTERVAL HPI: Patient seen and examined at bedside this morning. Pt denies any dizziness at this time. Offers no other c/o any sort. Denies chest pain, palpitations, sob, abdominal pain, headache or n/v/d.    Remaining ROS negative     PHYSICAL EXAM:  General: NAD, appears comfortable, resting tremor to UE   HEENT: PERRL, anicteric sclera  Cardiovascular:  RRR  Respiratory: CTA B/L  Gastrointestinal: soft, NT/ND; +BSx4  Extremities: no edema to LE  Vascular: 2+ radial pulses  Neurological: AAOx3; no focal deficits    VITAL SIGNS:  Vital Signs Last 24 Hrs  T(C): 36.6 (03 Mar 2025 09:43), Max: 37 (02 Mar 2025 22:00)  T(F): 97.8 (03 Mar 2025 09:43), Max: 98.6 (02 Mar 2025 22:00)  HR: 68 (03 Mar 2025 09:43) (64 - 86)  BP: 139/86 (03 Mar 2025 09:43) (136/74 - 144/80)  BP(mean): --  RR: 18 (03 Mar 2025 09:43) (17 - 18)  SpO2: 98% (03 Mar 2025 09:43) (95% - 98%)    Parameters below as of 03 Mar 2025 09:43  Patient On (Oxygen Delivery Method): room air          MEDICATIONS:  MEDICATIONS  (STANDING):  ascorbic acid 500 milliGRAM(s) Oral every 12 hours  aspirin  chewable 81 milliGRAM(s) Oral every 24 hours  atorvastatin 20 milliGRAM(s) Oral at bedtime  cholecalciferol 2000 Unit(s) Oral every 24 hours  enoxaparin Injectable 40 milliGRAM(s) SubCutaneous every 24 hours  multivitamin 1 Tablet(s) Oral every 24 hours  zinc sulfate 220 milliGRAM(s) Oral every 24 hours    MEDICATIONS  (PRN):  acetaminophen     Tablet .. 650 milliGRAM(s) Oral every 6 hours PRN Temp greater or equal to 38C (100.4F), Mild Pain (1 - 3)  meclizine 25 milliGRAM(s) Oral two times a day PRN Dizziness      ALLERGIES:  Allergies    avocado (Anaphylaxis (Mild); Angioedema (Mod to Severe); Headache; Other)  penicillin (Rash)    Intolerances    statins (Muscle Pain)      LABS:                        13.7   7.03  )-----------( 139      ( 02 Mar 2025 05:30 )             39.2     03-02    140  |  105  |  25[H]  ----------------------------<  94  3.6   |  28  |  0.77    Ca    9.2      02 Mar 2025 05:30  Phos  3.4     03-02  Mg     2.2     03-02        Urinalysis Basic - ( 02 Mar 2025 05:30 )    Color: x / Appearance: x / SG: x / pH: x  Gluc: 94 mg/dL / Ketone: x  / Bili: x / Urobili: x   Blood: x / Protein: x / Nitrite: x   Leuk Esterase: x / RBC: x / WBC x   Sq Epi: x / Non Sq Epi: x / Bacteria: x      CAPILLARY BLOOD GLUCOSE          RADIOLOGY & ADDITIONAL TESTS: Reviewed.

## 2025-03-03 NOTE — PROGRESS NOTE ADULT - PROBLEM SELECTOR PROBLEM 2
Inability to perform activities of daily living

## 2025-03-03 NOTE — PROGRESS NOTE ADULT - PROBLEM SELECTOR PLAN 8
F: None  E: Replete PRN  D: Regular  DVT: Lovenox   GI PPX: None  CODE: Full  Dispo: [facility]
Home med: Lipitor 10mg QHS    -C/w Lipitor 40mg
Home med: Lipitor 10mg QHS    -C/w Lipitor 40mg
F: None  E: Replete PRN  D: Regular  DVT: Lovenox   GI PPX: None  CODE: Full  Dispo: [facility]

## 2025-03-03 NOTE — PROGRESS NOTE ADULT - PROBLEM SELECTOR PLAN 4
Hx of CVA back in 2020, Pt at that time also presented with dizziness, CTH was negative. tPA was administered  for focal neurologic deficits of bilateral dysmetria, slurred speech, and left pronator drift. Monitored in MICU and was discharged on Aspirin 81mg, Plavix 75mg and Meclizine 12.5mg BID  Pt reports he doesn't take those medications any more because of a Nosebleed episode Jan 2021.    -C/w Aspirin 81mg and Lipitor 40mg   -Pt refused Plavix 75mg because of Epistaxis episode in Jan 2021
Hx of CVA back in 2020, Pt at that time also presented with dizziness, CTH was negative. tPA was administered  for focal neurologic deficits of bilateral dysmetria, slurred speech, and left pronator drift. Monitored in MICU and was discharged on Aspirin 81mg, Plavix 75mg and Meclizine 12.5mg BID  Pt reports he doesn't take those medications any more because of a Nosebleed episode Jan 2021.    -C/w Aspirin 81mg and Lipitor 40mg   -Pt refused Plavix 75mg because of Epistaxis episode in Jan 2021
Previously on Carbidopa-Levodopa 25-100mg 1 tab two times a day  Pt reports he stopped taking his medication two months a because it makes him drowsy and increased dizziness, pt made his PCP aware. overall feels parkinson's symptoms are worse, wants to try other meds       -Fall precautions  -Outpatient f/u with Parkinson's specialist
Previously on Carbidopa-Levodopa 25-100mg 1 tab two times a day  Pt reports he stopped taking his medication two months a because it makes him drowsy and increased dizziness, pt made his PCP aware. overall feels parkinson's symptoms are worse, wants to try other meds       -Fall precautions  -Outpatient f/u with Parkinson's specialist

## 2025-03-03 NOTE — PROGRESS NOTE ADULT - PROBLEM SELECTOR PLAN 3
Nutrition consulted and recs much appreciated    -C/w Ensure BID  -C/w Multi-Vitamin Daily  -C/w Zinc  220mg QD x 14 days (2/28- )  -C/w Vit C 500mg Q12hr
Nutrition consulted and recs much appreciated    -C/w Ensure BID  -C/w Multi-Vitamin Daily  -C/w Zinc  220mg QD x 14 days (2/28- )  -C/w Vit C 500mg Q12hr
Previously on Carbidopa-Levodopa 25-100mg 1 tab two times a day  Pt reports he stopped taking his medication two months a because it makes him drowsy and increased dizziness, pt made his PCP aware. overall feels parkinson's symptoms are worse, wants to try other meds       -Fall precautions  -Outpatient f/u with Parkinson's specialist
Previously on Carbidopa-Levodopa 25-100mg 1 tab two times a day  Pt reports he stopped taking his medication two months a because it makes him drowsy and increased dizziness, pt made his PCP aware. overall feels parkinson's symptoms are worse, wants to try other meds       -Fall precautions  -Outpatient f/u with Parkinson's specialist

## 2025-03-03 NOTE — DISCHARGE NOTE PROVIDER - DETAILS OF MALNUTRITION DIAGNOSIS/DIAGNOSES
This patient has been assessed with a concern for Malnutrition and was treated during this hospitalization for the following Nutrition diagnosis/diagnoses:     -  02/28/2025: Severe protein-calorie malnutrition

## 2025-03-03 NOTE — PROGRESS NOTE ADULT - PROBLEM SELECTOR PLAN 2
Pt and daughter report that he has been having increasing difficulty with ADL's and mobility. Pt walks with a walker at home but over last 2 months due to generalized weakness pt is having hard time with mobility and balance. Both are in agreement that given his increased weakness and age, pt would like to go to a Nursing Home.    -F/u PT/OT recs  -SW consulted
Pt and daughter report that he has been having increasing difficulty with ADL's and mobility. Pt walks with a walker at home but over last 2 months due to generalized weakness pt is having hard time with mobility and balance. Both are in agreement that given his increased weakness and age, pt would like to go to a Nursing Home.    -PT/OT- Rec AUGUSTINA  -FLOR consulted

## 2025-03-03 NOTE — DISCHARGE NOTE NURSING/CASE MANAGEMENT/SOCIAL WORK - PATIENT PORTAL LINK FT
You can access the FollowMyHealth Patient Portal offered by [facility] by registering at the following website: [email or web address]. By joining [employer]’s FollowMyHealth portal, you will also be able to view your health information using other applications (apps) compatible with our system.

## 2025-03-03 NOTE — PROGRESS NOTE ADULT - PROBLEM SELECTOR PROBLEM 5
HTN (hypertension)
H/O: CVA (cerebrovascular accident)
HTN (hypertension)
H/O: CVA (cerebrovascular accident)

## 2025-03-03 NOTE — DISCHARGE NOTE NURSING/CASE MANAGEMENT/SOCIAL WORK - FINANCIAL ASSISTANCE
[facility] provides services at a reduced cost to those who are determined to be eligible through [facility]’s financial assistance program. Information regarding [facility]’s financial assistance program can be found by going to [email or web address] or by calling [phone].

## 2025-03-03 NOTE — PROGRESS NOTE ADULT - NUTRITIONAL ASSESSMENT
This patient has been assessed with a concern for Malnutrition and has been determined to have a diagnosis/diagnoses of Severe protein-calorie malnutrition.    This patient is being managed with:   Diet Regular-  Supplement Feeding Modality:  Oral  Ensure Plus High Protein Cans or Servings Per Day:  1       Frequency:  Two Times a day  Entered: Feb 28 2025  4:12PM  

## 2025-03-03 NOTE — PROGRESS NOTE ADULT - ASSESSMENT
I M    85 y/o with PMHx of Parkinson's (previously on Carbidopa-Levodopa ), CVA (2020), HTN, HLD, Hammertoe P/W acute on chronic sensation of dizziness and room spinning every time he moved his head, admitted for further evaluation/management and also for placement to a Nursing home.      Problem/Plan - 1:  ·  Problem: Vertigo.   ·  Plan: IMPROVED   Acute on chronic sensation of dizziness and room spinning every time he moved his head and then it resolved on it's on. Reports dizziness when he rotates his head to the Right side, denies dizziness when rotating to the left side. As per chart review, pt has been seen in the ED prior for similar complaint and was instructed to f/u with Neurology outpatient but f/u was lost.  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 No large vessel occlusion   or significant stenosis. Please read radiology report for more info.  NSGY consulted in ED- No acute intervention at this time   No s/s of Cerebral stroke   Orthostatics +  Likely BPPV    -C/w Meclizine 25mg BID PRN   -Fall precautions.    Problem/Plan - 2:  ·  Problem: Inability to perform activities of daily living.   ·  Plan: Pt and daughter report that he has been having increasing difficulty with ADL's and mobility. Pt walks with a walker at home but over last 2 months due to generalized weakness pt is having hard time with mobility and balance. Both are in agreement that given his increased weakness and age, pt would like to go to a Nursing Home.    -PT/OT- Rec AUGUSTINA  -SW consulted.    Problem/Plan - 3:  ·  Problem: Parkinsons disease.   ·  Plan: Previously on Carbidopa-Levodopa 25-100mg 1 tab two times a day  Pt reports he stopped taking his medication two months a because it makes him drowsy and increased dizziness, pt made his PCP aware. overall feels parkinson's symptoms are worse, wants to try other meds       -Fall precautions  -Outpatient f/u with Parkinson's specialist.    Problem/Plan - 4:  ·  Problem: H/O: CVA (cerebrovascular accident).   ·  Plan: Hx of CVA back in 2020, Pt at that time also presented with dizziness, CTH was negative. tPA was administered  for focal neurologic deficits of bilateral dysmetria, slurred speech, and left pronator drift. Monitored in MICU and was discharged on Aspirin 81mg, Plavix 75mg and Meclizine 12.5mg BID  Pt reports he doesn't take those medications any more because of a Nosebleed episode Jan 2021.    -C/w Aspirin 81mg and Lipitor 40mg   -Pt refused Plavix 75mg because of Epistaxis episode in Jan 2021.    Problem/Plan - 5:  ·  Problem: HTN (hypertension).   ·  Plan: Home med: Norvasc 10mg QD    -Holding home meds Norvasc for now.    Problem/Plan - 6:  ·  Problem: Brain aneurysm.   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     -No intervention per NSG   -Outpt f/u.    Problem/Plan - 7:  ·  Problem: HLD (hyperlipidemia).   ·  Plan: Home med: Lipitor 10mg QHS    -C/w Lipitor 40mg.    Problem/Plan - 8:  ·  Problem: Prophylactic measure.   ·  Plan: F: None  E: Replete PRN  D: Regular  DVT: Lovenox   GI PPX: None  CODE: Full  [email or web address]    
85 y/o with PMHx of Parkinson's (previously on Carbidopa-Levodopa ), CVA (2020), HTN, HLD, Hammertoe P/W acute on chronic sensation of dizziness and room spinning every time he moved his head, admitted for management and also for placement to a Nursing home. Symptoms now resolved.      #Vertigo.   ·  Plan: IMPROVED   Acute on chronic sensation of dizziness and room spinning every time he moved his head and then it resolved on it's on. Reports dizziness when he rotates his head to the Right side, denies dizziness when rotating to the left side. As per chart review, pt has been seen in the ED prior for similar complaint and was instructed to f/u with Neurology outpatient but f/u was lost.  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 No large vessel occlusion   or significant stenosis. Please read radiology report for more info.  NSGY consulted in ED- No acute intervention at this time   No s/s of Cerebral stroke   Orthostatics +  Likely BPPV    -C/w Meclizine 25mg BID PRN   -Fall precautions.    #Inability to perform activities of daily living.   ·  Plan: Pt and daughter report that he has been having increasing difficulty with ADL's and mobility. Pt walks with a walker at home but over last 2 months due to generalized weakness pt is having hard time with mobility and balance. Both are in agreement that given his increased weakness and age, pt would like to go to a Nursing Home.    -PT/OT- Rec AUGUSTINA  -SW consulted - approval pending for possible 3/4    #Severe protein-calorie malnutrition.   ·  Plan: Nutrition consulted and recs much appreciated    -C/w Ensure BID  -C/w Multi-Vitamin Daily  -C/w Zinc  220mg QD x 14 days (2/28- )  -C/w Vit C 500mg Q12hr.    #Parkinsons disease.   ·  Plan: Previously on Carbidopa-Levodopa 25-100mg 1 tab two times a day  Pt reports he stopped taking his medication two months a because it makes him drowsy and increased dizziness, pt made his PCP aware. overall feels parkinson's symptoms are worse, wants to try other meds       -Fall precautions  -Outpatient f/u with Parkinson's specialist.    #H/O: CVA (cerebrovascular accident).   ·  Plan: Hx of CVA back in 2020, Pt at that time also presented with dizziness, CTH was negative. tPA was administered  for focal neurologic deficits of bilateral dysmetria, slurred speech, and left pronator drift. Monitored in MICU and was discharged on Aspirin 81mg, Plavix 75mg and Meclizine 12.5mg BID  Pt reports he doesn't take those medications any more because of a Nosebleed episode Jan 2021.    -C/w Aspirin 81mg and Lipitor 40mg   Patient no longer on plavix due to r/b discussion 2/2 repeat epistaxis    #HTN (hypertension).   ·  Plan: Home med: Norvasc 10mg QD    -restart Norvasc when needed.    #Brain aneurysm.   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     -No intervention per NSG   -Outpt f/u.    #HLD (hyperlipidemia).   ·  Plan: Home med: Lipitor 10mg QHS    -C/w Lipitor 40mg.    #Prophylactic measure.   ·  Plan: F: None  E: Replete PRN  D: Regular  DVT: Lovenox   GI PPX: None  CODE: Full  Dispo: [facility].
  I M    85 y/o with PMHx of Parkinson's (previously on Carbidopa-Levodopa ), CVA (2020), HTN, HLD, Hammertoe P/W acute on chronic sensation of dizziness and room spinning every time he moved his head, admitted for further evaluation/management and also for placement to a Nursing home. PT rec AUGUSTINA. Pending AUGUSTINA placement     Nutritional Assessment:  · Nutritional Assessment  This patient has been assessed with a concern for Malnutrition and has been determined to have a diagnosis/diagnoses of Severe protein-calorie malnutrition.    This patient is being managed with:   Diet Regular-  Supplement Feeding Modality:  Oral  Ensure Plus High Protein Cans or Servings Per Day:  1       Frequency:  Two Times a day  Entered: Feb 28 2025  4:12PM    Problem/Plan - 1:  ·  Problem: Vertigo.   ·  Plan: IMPROVED   Acute on chronic sensation of dizziness and room spinning every time he moved his head and then it resolved on it's on. Reports dizziness when he rotates his head to the Right side, denies dizziness when rotating to the left side. As per chart review, pt has been seen in the ED prior for similar complaint and was instructed to f/u with Neurology outpatient but f/u was lost.  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 No large vessel occlusion   or significant stenosis. Please read radiology report for more info.  NSGY consulted in ED- No acute intervention at this time   No s/s of Cerebral stroke   Orthostatics +  Likely BPPV    -C/w Meclizine 25mg BID PRN   -Fall precautions.    Problem/Plan - 2:  ·  Problem: Inability to perform activities of daily living.   ·  Plan: Pt and daughter report that he has been having increasing difficulty with ADL's and mobility. Pt walks with a walker at home but over last 2 months due to generalized weakness pt is having hard time with mobility and balance. Both are in agreement that given his increased weakness and age, pt would like to go to a Nursing Home.    -PT/OT- Rec AUGUSTINA  -FLOR consulted.    Problem/Plan - 3:  ·  Problem: Severe protein-calorie malnutrition.   ·  Plan: Nutrition consulted and recs much appreciated    -C/w Ensure BID  -C/w Multi-Vitamin Daily  -C/w Zinc  220mg QD x 14 days (2/28- )  -C/w Vit C 500mg Q12hr.    Problem/Plan - 4:  ·  Problem: Parkinsons disease.   ·  Plan: Previously on Carbidopa-Levodopa 25-100mg 1 tab two times a day  Pt reports he stopped taking his medication two months a because it makes him drowsy and increased dizziness, pt made his PCP aware. overall feels parkinson's symptoms are worse, wants to try other meds       -Fall precautions  -Outpatient f/u with Parkinson's specialist.    Problem/Plan - 5:  ·  Problem: H/O: CVA (cerebrovascular accident).   ·  Plan: Hx of CVA back in 2020, Pt at that time also presented with dizziness, CTH was negative. tPA was administered  for focal neurologic deficits of bilateral dysmetria, slurred speech, and left pronator drift. Monitored in MICU and was discharged on Aspirin 81mg, Plavix 75mg and Meclizine 12.5mg BID  Pt reports he doesn't take those medications any more because of a Nosebleed episode Jan 2021.    -C/w Aspirin 81mg and Lipitor 40mg   -Pt refused Plavix 75mg because of Epistaxis episode in Jan 2021.    Problem/Plan - 6:  ·  Problem: HTN (hypertension).   ·  Plan: Home med: Norvasc 10mg QD    -Holding home meds Norvasc for now.    Problem/Plan - 7:  ·  Problem: Brain aneurysm.   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     -No intervention per NSG   -Outpt f/u.    Problem/Plan - 8:  ·  Problem: HLD (hyperlipidemia).   ·  Plan: Home med: Lipitor 10mg QHS    -C/w Lipitor 40mg.    Problem/Plan - 9:  ·  Problem: Prophylactic measure.   ·  Plan: F: None  E: Replete PRN  D: Regular  DVT: Lovenox   GI PPX: None  CODE: Full  Dispo: [facility].      
85 y/o with PMHx of Parkinson's (previously on Carbidopa-Levodopa ), CVA (2020), HTN, HLD, Hammertoe P/W acute on chronic sensation of dizziness and room spinning every time he moved his head, admitted for further evaluation/management and also for placement to a Nursing home.
85 y/o with PMHx of Parkinson's (previously on Carbidopa-Levodopa ), CVA (2020), HTN, HLD, Hammertoe P/W acute on chronic sensation of dizziness and room spinning every time he moved his head, admitted for further evaluation/management and also for placement to a Nursing home.
83 y/o with PMHx of Parkinson's (previously on Carbidopa-Levodopa ), CVA (2020), HTN, HLD, Hammertoe P/W acute on chronic sensation of dizziness and room spinning every time he moved his head, admitted for further evaluation/management and also for placement to a Nursing home.
83 y/o with PMHx of Parkinson's (previously on Carbidopa-Levodopa ), CVA (2020), HTN, HLD, Hammertoe P/W acute on chronic sensation of dizziness and room spinning every time he moved his head, admitted for further evaluation/management and also for placement to a Nursing home. PT rec AUGUSTINA. Pending AUGUSTINA placement

## 2025-03-03 NOTE — DISCHARGE NOTE PROVIDER - CARE PROVIDER_API CALL
Sergio Borden.  Neurosurgery  [address] [facility] [address]  Phone: [phone]  Fax: [phone]  Follow Up Time: 2 weeks

## 2025-03-03 NOTE — DISCHARGE NOTE PROVIDER - HOSPITAL COURSE
#Discharge: do not delete    83 y/o with PMHx of Parkinson's (previously on Carbidopa-Levodopa ), CVA (2020), HTN, HLD, Hammertoe P/W acute on chronic sensation of dizziness and room spinning every time he moved his head, admitted for further evaluation/management and also for placement to a Nursing home. CTH: No evidence of acute intracranial hemorrhage or acute transcortical infarct. 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 No large vessel occlusion or significant stenosis. Please read radiology report for more info. NSGY consulted in ED- No acute intervention at this time No s/s of Cerebral stroke. Orthostatics +. Pt dizziness resolved and hasn't required any PRN Meclizine. PT rec AUGUSTINA. Pt is medically stable and ready for discharge.       Problem List/Main Diagnoses:     #Vertigo-IMPROVED   Acute on chronic sensation of dizziness and room spinning every time he moved his head and then it resolved on it's on. Reports dizziness when he rotates his head to the Right side, denies dizziness when rotating to the left side. As per chart review, pt has been seen in the ED prior for similar complaint and was instructed to f/u with Neurology outpatient but f/u was lost.  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 No large vessel occlusion   or significant stenosis. Please read radiology report for more info.  NSGY consulted in ED- No acute intervention at this time   No s/s of Cerebral stroke   Orthostatics +  Likely BPPV  -C/w Meclizine 25mg BID PRN   -Fall precautions.    #Severe protein-calorie malnutrition.   Nutrition consulted and recs much appreciated  -C/w Ensure BID  -C/w Multi-Vitamin Daily  -C/w Zinc  220mg QD x 14 days (2/28-3/14)  -C/w Vit C 500mg Q12hr    #Parkinson's disease  Previously on Carbidopa-Levodopa 25-100mg 1 tab two times a day  Pt reports he stopped taking his medication two months a because it makes him drowsy and increased dizziness, pt made his PCP aware. overall feels parkinson's symptoms are worse, wants to try other meds   -Fall precautions  -Outpatient f/u with PCP/Neurology     #H/O: CVA (cerebrovascular accident)  Hx of CVA back in 2020, Pt at that time also presented with dizziness, CTH was negative. tPA was administered  for focal neurologic deficits of bilateral dysmetria, slurred speech, and left pronator drift. Monitored in MICU and was discharged on Aspirin 81mg, Plavix 75mg and Meclizine 12.5mg BID  Pt reports he doesn't take those medications any more because of a Nosebleed episode Jan 2021.  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   -No intervention per NSG   -Outpt f/u    HLD (hyperlipidemia)  -C/w Lipitor 20mg      New medications/therapies: Meclizine 25mg PRN BID, Lipitor 20mg QD    Discharge plan: [facility] to [facility]    Physical Exam Upon Discharge:  General: NAD, appears comfortable , resting tremor to UE  HEENT:  PERRL, anicteric sclera  Cardiovascular:  RRR  Respiratory: CTA B/L  Gastrointestinal: soft, NT/ND; +BSx4  Extremities: no edema to LE  Vascular: 2+ radial pulses  Neurological: AAOx3; no focal deficits

## 2025-03-03 NOTE — DISCHARGE NOTE PROVIDER - NSDCCPTREATMENT_GEN_ALL_CORE_FT
PRINCIPAL PROCEDURE  Procedure: CT angiogram head and neck vessels w contrast  Findings and Treatment: CTA BRAIN:  Bilateral cavernous carotid artery calcifications. 4.5 mm anterior   communicating artery aneurysm.  The [place] and vertebrobasilar system are unremarkable without   evidence of stenosis, occlusion or other saccular aneurysm dilation. No   evidence for arterial venous malformation. The vertebral arteries are   codominant.  CTA NECK:  Mild bilateral carotid bulb calcifications.  A left-sided aortic arch is demonstrated. There is normal relationship to   the great vessels. The common carotid arteries, internal carotid arteries   and vertebral arteries shows no evidence of significant stenosis,   occlusion or saccular aneurysm dilation. The vertebral arteries are   codominant.  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 No large vessel occlusion   or significant stenosis.        SECONDARY PROCEDURE  Procedure: CT head wo con  Findings and Treatment: CONTRAST:  IV Contrast: NONE  .  Technique: CT head was performed utilizing axial images from the base of   the skull through the vertex without the administration of intravenous   contrast. Images were reviewed in the bone, brain and subdural windows.  Findings:  There is no evidence of acute intracranial hemorrhage or acute   transcortical infarct. Mild patchy areas of low density within the white   matter consistent with minimal microvascular disease. The ventricles are   normal in size and caliber.  There is no evidence of mass-effect or midline shift. There is no   evidence of an intra or extra-axial fluid collection.  Visualized paranasal sinuses and bilateral mastoid air cells are clear.

## 2025-03-03 NOTE — PROGRESS NOTE ADULT - PROVIDER SPECIALTY LIST ADULT
Internal Medicine
Rehab Medicine
Rehab Medicine
Hospitalist

## 2025-03-03 NOTE — PROGRESS NOTE ADULT - PROBLEM SELECTOR PLAN 6
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     -No intervention per NSG   -Outpt f/u
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     -No intervention per NSG   -Outpt f/up
Home med: Norvasc 10mg QD    -Holding home meds Norvasc for now
Home med: Norvasc 10mg QD    -Holding home meds Norvasc for now

## 2025-03-03 NOTE — DISCHARGE NOTE PROVIDER - NSDCMRMEDTOKEN_GEN_ALL_CORE_FT
ascorbic acid 500 mg oral tablet: 1 tab(s) orally every 12 hours  aspirin 81 mg oral tablet, chewable: 1 tab(s) orally once a day  atorvastatin 20 mg oral tablet: 1 tab(s) orally once a day (at bedtime)  cholecalciferol oral tablet: 2000 unit(s) orally every 24 hours  meclizine 25 mg oral tablet: 1 tab(s) orally 2 times a day As needed Dizziness  Multiple Vitamins oral tablet: 1 tab(s) orally once a day  Zinc-220 mg (as elemental zinc 50 mg) oral capsule: 1 cap(s) orally every 24 hours Last Day 3/15

## 2025-03-04 PROBLEM — I72.9 ANEURYSM: Status: ACTIVE | Noted: 2025-03-04

## 2025-03-08 DIAGNOSIS — H81.21 VESTIBULAR NEURONITIS, RIGHT EAR: ICD-10-CM

## 2025-03-08 DIAGNOSIS — I10 ESSENTIAL (PRIMARY) HYPERTENSION: ICD-10-CM

## 2025-03-08 DIAGNOSIS — E78.5 HYPERLIPIDEMIA, UNSPECIFIED: ICD-10-CM

## 2025-03-08 DIAGNOSIS — I67.1 CEREBRAL ANEURYSM, NONRUPTURED: ICD-10-CM

## 2025-03-08 DIAGNOSIS — Z86.73 PERSONAL HISTORY OF TRANSIENT ISCHEMIC ATTACK (TIA), AND CEREBRAL INFARCTION WITHOUT RESIDUAL DEFICITS: ICD-10-CM

## 2025-03-08 DIAGNOSIS — R42 DIZZINESS AND GIDDINESS: ICD-10-CM

## 2025-03-08 DIAGNOSIS — Z79.82 LONG TERM (CURRENT) USE OF ASPIRIN: ICD-10-CM

## 2025-03-08 DIAGNOSIS — Z79.899 OTHER LONG TERM (CURRENT) DRUG THERAPY: ICD-10-CM

## 2025-03-08 DIAGNOSIS — E43 UNSPECIFIED SEVERE PROTEIN-CALORIE MALNUTRITION: ICD-10-CM

## 2025-03-08 DIAGNOSIS — G20.A1 PARKINSON'S DISEASE WITHOUT DYSKINESIA, WITHOUT MENTION OF FLUCTUATIONS: ICD-10-CM

## 2025-03-10 ENCOUNTER — APPOINTMENT (OUTPATIENT)
Dept: NEUROSURGERY | Facility: CLINIC | Age: 85
End: 2025-03-10
Payer: MEDICARE

## 2025-03-10 DIAGNOSIS — I72.9 ANEURYSM OF UNSPECIFIED SITE: ICD-10-CM

## 2025-03-11 PROBLEM — Z87.898 HISTORY OF DIZZINESS: Status: RESOLVED | Noted: 2025-03-11 | Resolved: 2025-03-11

## 2025-03-11 PROBLEM — Z86.79 HISTORY OF ESSENTIAL HYPERTENSION: Status: RESOLVED | Noted: 2025-03-11 | Resolved: 2025-03-11

## 2025-03-11 PROBLEM — Z86.39 HISTORY OF HYPERLIPIDEMIA: Status: RESOLVED | Noted: 2025-03-11 | Resolved: 2025-03-11

## 2025-03-11 PROBLEM — I67.1 ANEURYSM, CEREBRAL: Status: ACTIVE | Noted: 2025-03-11

## 2025-03-11 PROBLEM — Z86.73 HISTORY OF CEREBROVASCULAR ACCIDENT: Status: RESOLVED | Noted: 2025-03-11 | Resolved: 2025-03-11

## 2025-03-11 PROBLEM — Z86.69 HISTORY OF PARKINSON'S DISEASE: Status: RESOLVED | Noted: 2025-03-11 | Resolved: 2025-03-11

## 2025-03-14 ENCOUNTER — APPOINTMENT (OUTPATIENT)
Dept: NEUROSURGERY | Facility: CLINIC | Age: 85
End: 2025-03-14
Payer: MEDICARE

## 2025-03-14 DIAGNOSIS — Z86.79 PERSONAL HISTORY OF OTHER DISEASES OF THE CIRCULATORY SYSTEM: ICD-10-CM

## 2025-03-14 DIAGNOSIS — Z87.898 PERSONAL HISTORY OF OTHER SPECIFIED CONDITIONS: ICD-10-CM

## 2025-03-14 DIAGNOSIS — R42 DIZZINESS AND GIDDINESS: ICD-10-CM

## 2025-03-14 DIAGNOSIS — I67.1 CEREBRAL ANEURYSM, NONRUPTURED: ICD-10-CM

## 2025-03-14 DIAGNOSIS — Z86.39 PERSONAL HISTORY OF OTHER ENDOCRINE, NUTRITIONAL AND METABOLIC DISEASE: ICD-10-CM

## 2025-03-14 DIAGNOSIS — G20.A1 PARKINSON'S DISEASE WITHOUT DYSKINESIA, WITHOUT MENTION OF FLUCTUATIONS: ICD-10-CM

## 2025-03-14 DIAGNOSIS — Z86.73 PERSONAL HISTORY OF TRANSIENT ISCHEMIC ATTACK (TIA), AND CEREBRAL INFARCTION W/OUT RESIDUAL DEFICITS: ICD-10-CM

## 2025-03-14 DIAGNOSIS — Z86.69 PERSONAL HISTORY OF OTHER DISEASES OF THE NERVOUS SYSTEM AND SENSE ORGANS: ICD-10-CM

## 2025-03-14 PROCEDURE — 99205 OFFICE O/P NEW HI 60 MIN: CPT

## 2025-03-14 RX ORDER — ATORVASTATIN CALCIUM 20 MG/1
20 TABLET, FILM COATED ORAL
Refills: 0 | Status: ACTIVE | COMMUNITY

## 2025-03-14 RX ORDER — MECLIZINE HYDROCHLORIDE 25 MG/1
25 TABLET ORAL
Refills: 0 | Status: ACTIVE | COMMUNITY

## 2025-03-14 RX ORDER — CHLORHEXIDINE GLUCONATE 4 %
LIQUID (ML) TOPICAL
Refills: 0 | Status: ACTIVE | COMMUNITY

## 2025-03-14 RX ORDER — ASPIRIN 81 MG
81 TABLET, DELAYED RELEASE (ENTERIC COATED) ORAL
Refills: 0 | Status: ACTIVE | COMMUNITY

## 2025-03-14 RX ORDER — MULTIVIT-MIN/IRON/FOLIC ACID/K 18-600-40
50 MCG CAPSULE ORAL
Refills: 0 | Status: ACTIVE | COMMUNITY

## 2025-03-14 RX ORDER — CETIRIZINE HCL 10 MG
10 TABLET ORAL
Refills: 0 | Status: ACTIVE | COMMUNITY

## 2025-05-23 NOTE — PROGRESS NOTE ADULT - TIME BILLING
Message from [facility]  From: Gopal Sierra, RRT  Sent: 5/20/2025   4:29 PM CDT  To: BOONE Cunningham  Subject: pulse ox testing                                  Good morning, [facility] is not currently providing overnight trended pulse oximetry studies, this order should be directed to Ximena, thank you  Will reorder nebulizer and sleep oximeter and will send to [facility]im[facility] Issa as advised  
Reviewing patient chart and history, Review of patient labs and imaging, obtaining and reviewing obtained history, performing medical examination and evaluation at bedside, counseling and educting patient, family, or caregiver, ordering additional tests, therapies, treatments or communicating with other healthcare professionals, documenting, and coordination of care.      Miguel Rivera DO, PhD  Internal Medicine, Hospitalist  [email or web address]
Bedside exam and interview   Reviewed vitals, labs   Discussed patient's plan of care with housestaff   Documentation of encounter
Reviewing patient chart and history, Review of patient labs and imaging, obtaining and reviewing obtained history, performing medical examination and evaluation at bedside, counseling and educting patient, family, or caregiver, ordering additional tests, therapies, treatments or communicating with other healthcare professionals, documenting, and coordination of care.    Miguel Rivera DO, PhD  Internal Medicine, Hospitalist  [email or web address]